# Patient Record
Sex: FEMALE | Race: WHITE | NOT HISPANIC OR LATINO | Employment: UNEMPLOYED | URBAN - METROPOLITAN AREA
[De-identification: names, ages, dates, MRNs, and addresses within clinical notes are randomized per-mention and may not be internally consistent; named-entity substitution may affect disease eponyms.]

---

## 2017-01-17 ENCOUNTER — APPOINTMENT (OUTPATIENT)
Dept: WOMENS IMAGING | Facility: HOSPITAL | Age: 57
End: 2017-01-17

## 2017-01-17 PROCEDURE — 77065 DX MAMMO INCL CAD UNI: CPT | Performed by: RADIOLOGY

## 2017-01-17 PROCEDURE — G0206 DX MAMMO INCL CAD UNI: HCPCS | Performed by: RADIOLOGY

## 2017-01-17 PROCEDURE — 76641 ULTRASOUND BREAST COMPLETE: CPT | Performed by: RADIOLOGY

## 2017-01-17 PROCEDURE — 77061 BREAST TOMOSYNTHESIS UNI: CPT | Performed by: RADIOLOGY

## 2017-01-17 PROCEDURE — G0279 TOMOSYNTHESIS, MAMMO: HCPCS | Performed by: RADIOLOGY

## 2017-05-01 ENCOUNTER — TRANSCRIBE ORDERS (OUTPATIENT)
Dept: ADMINISTRATIVE | Facility: HOSPITAL | Age: 57
End: 2017-05-01

## 2017-05-01 DIAGNOSIS — Z80.3 FH: BREAST CANCER: Primary | ICD-10-CM

## 2017-06-06 ENCOUNTER — HOSPITAL ENCOUNTER (OUTPATIENT)
Dept: MRI IMAGING | Facility: HOSPITAL | Age: 57
Discharge: HOME | End: 2017-06-06
Attending: OBSTETRICS & GYNECOLOGY | Admitting: OBSTETRICS & GYNECOLOGY

## 2017-06-06 DIAGNOSIS — Z80.3 FH: BREAST CANCER: ICD-10-CM

## 2017-06-06 LAB — CREAT BLDA-MCNC: 0.8 MG/DL (ref 0.6–1.3)

## 2017-06-06 PROCEDURE — 0159T HC MRI BREAST COMPUTER ANALYSIS: CPT

## 2017-06-06 PROCEDURE — A9577 INJ MULTIHANCE: HCPCS | Performed by: OBSTETRICS & GYNECOLOGY

## 2017-06-06 PROCEDURE — C8908 MRI W/O FOL W/CONT, BREAST,: HCPCS

## 2017-06-06 PROCEDURE — 82565 ASSAY OF CREATININE: CPT

## 2017-06-06 PROCEDURE — 0 GADOBENATE DIMEGLUMINE 529 MG/ML SOLUTION: Performed by: OBSTETRICS & GYNECOLOGY

## 2017-06-06 RX ADMIN — GADOBENATE DIMEGLUMINE 15 ML: 529 INJECTION, SOLUTION INTRAVENOUS at 10:20

## 2017-11-20 RX ORDER — ESCITALOPRAM OXALATE 10 MG/1
TABLET ORAL
Qty: 30 TABLET | Refills: 0 | Status: SHIPPED | OUTPATIENT
Start: 2017-11-20 | End: 2017-12-21 | Stop reason: SDUPTHER

## 2017-12-07 ENCOUNTER — APPOINTMENT (OUTPATIENT)
Dept: WOMENS IMAGING | Facility: HOSPITAL | Age: 57
End: 2017-12-07

## 2017-12-07 PROCEDURE — 77063 BREAST TOMOSYNTHESIS BI: CPT | Performed by: RADIOLOGY

## 2017-12-07 PROCEDURE — 77067 SCR MAMMO BI INCL CAD: CPT | Performed by: RADIOLOGY

## 2017-12-21 RX ORDER — ESCITALOPRAM OXALATE 10 MG/1
TABLET ORAL
Qty: 15 TABLET | Refills: 0 | Status: SHIPPED | OUTPATIENT
Start: 2017-12-21 | End: 2018-01-11 | Stop reason: SDUPTHER

## 2018-01-09 RX ORDER — ESCITALOPRAM OXALATE 10 MG/1
TABLET ORAL
Qty: 15 TABLET | Refills: 0 | OUTPATIENT
Start: 2018-01-09

## 2018-01-11 ENCOUNTER — OFFICE VISIT (OUTPATIENT)
Dept: FAMILY MEDICINE CLINIC | Facility: CLINIC | Age: 58
End: 2018-01-11

## 2018-01-11 VITALS
BODY MASS INDEX: 27.47 KG/M2 | HEART RATE: 82 BPM | WEIGHT: 175 LBS | RESPIRATION RATE: 18 BRPM | HEIGHT: 67 IN | SYSTOLIC BLOOD PRESSURE: 120 MMHG | DIASTOLIC BLOOD PRESSURE: 62 MMHG

## 2018-01-11 DIAGNOSIS — F41.9 ANXIETY: Primary | ICD-10-CM

## 2018-01-11 PROCEDURE — 99213 OFFICE O/P EST LOW 20 MIN: CPT | Performed by: INTERNAL MEDICINE

## 2018-01-11 RX ORDER — RALOXIFENE HYDROCHLORIDE 60 MG/1
60 TABLET, FILM COATED ORAL DAILY
Refills: 12 | COMMUNITY
Start: 2018-01-03 | End: 2022-02-11

## 2018-01-11 RX ORDER — BUSPIRONE HYDROCHLORIDE 30 MG/1
30 TABLET ORAL DAILY
Qty: 30 TABLET | Refills: 11 | Status: SHIPPED | OUTPATIENT
Start: 2018-01-11 | End: 2019-01-25 | Stop reason: SDUPTHER

## 2018-01-11 RX ORDER — ESCITALOPRAM OXALATE 10 MG/1
10 TABLET ORAL DAILY
Qty: 30 TABLET | Refills: 11 | Status: SHIPPED | OUTPATIENT
Start: 2018-01-11 | End: 2019-02-19 | Stop reason: SDUPTHER

## 2018-01-11 NOTE — PROGRESS NOTES
Wilson Wood is a 57 y.o. female. Patient is here today for   Chief Complaint   Patient presents with   • Anxiety     Buspar           Vitals:    01/11/18 0842   BP: 120/62   Pulse: 82   Resp: 18     The following portions of the patient's history were reviewed and updated as appropriate: allergies, current medications, past family history, past medical history, past social history, past surgical history and problem list.    Past Medical History:   Diagnosis Date   • Allergic rhinitis    • Anxiety    • Hypothyroidism       Allergies   Allergen Reactions   • Sulfa Antibiotics Hives     Hives, itching, swelling   • Latex Hives     Hives and itching      Social History     Social History   • Marital status:      Spouse name: N/A   • Number of children: N/A   • Years of education: N/A     Occupational History   • Not on file.     Social History Main Topics   • Smoking status: Never Smoker   • Smokeless tobacco: Not on file   • Alcohol use Yes   • Drug use: Not on file   • Sexual activity: Not on file     Other Topics Concern   • Not on file     Social History Narrative        Current Outpatient Prescriptions:   •  albuterol (PROAIR HFA) 108 (90 BASE) MCG/ACT inhaler, ProAir  (90 Base) MCG/ACT Inhalation Aerosol Solution; Patient Sig: ProAir  (90 Base) MCG/ACT Inhalation Aerosol Solution INHALE 1 PUFF EVERY 4 HOURS AS NEEDED; 1; 3; 02-Jan-2015; Active, Disp: , Rfl:   •  busPIRone (BUSPAR) 30 MG tablet, Take 1 tablet by mouth Daily., Disp: 30 tablet, Rfl: 11  •  Cholecalciferol (VITAMIN D) 2000 UNITS capsule, Take  by mouth., Disp: , Rfl:   •  escitalopram (LEXAPRO) 10 MG tablet, Take 1 tablet by mouth Daily., Disp: 30 tablet, Rfl: 11  •  fexofenadine (ALLEGRA ALLERGY) 180 MG tablet, Take  by mouth., Disp: , Rfl:   •  multivitamin (THERAGRAN) tablet tablet, Take  by mouth., Disp: , Rfl:   •  Omega-3 Fatty Acids (FISH OIL) 1000 MG capsule capsule, Take  by mouth., Disp: , Rfl:   •   SYNTHROID 50 MCG tablet, TAKE 3 TABLETS BY MOUTH AT BEDTIME, Disp: , Rfl: 12  •  vitamin B-12 (CYANOCOBALAMIN) 100 MCG tablet, Take  by mouth., Disp: , Rfl:   •  raloxifene (EVISTA) 60 MG tablet, TAKE 1 TABLET BY MOUTH DAILY, Disp: , Rfl: 12     Objective     History of Present Illness Jessica is here today to get a refill on Lexapro and BuSpar that she takes for chronic anxiety.  She was going to a psychiatrist but her office closed.  She feels well.  She exercises at least 3 days a week.  She also has hypothyroidism and is followed by endocrinology.  She sees her gynecologist annually.  She did get a flu vaccination this year.    Review of Systems   Constitutional: Negative for activity change.   Respiratory: Negative.    Cardiovascular: Negative.    Psychiatric/Behavioral: Negative for dysphoric mood. The patient is not nervous/anxious.        Physical Exam   Constitutional: She appears well-developed and well-nourished.   Cardiovascular: Normal rate, regular rhythm and normal heart sounds.    Neurological: She is alert.   Psychiatric: She has a normal mood and affect. Her behavior is normal. Judgment and thought content normal.   Vitals reviewed.      ASSESSMENT     Problem List Items Addressed This Visit        Other    Anxiety - Primary          PLAN  Patient Instructions   We'll continue Lexapro and BuSpar at current dose.  Continue exercise as well.  Follow-up annually    Return in about 1 year (around 1/11/2019).

## 2018-01-11 NOTE — PATIENT INSTRUCTIONS
We'll continue Lexapro and BuSpar at current dose.  Continue exercise as well.  Follow-up annually

## 2018-06-01 ENCOUNTER — TRANSCRIBE ORDERS (OUTPATIENT)
Dept: ADMINISTRATIVE | Facility: HOSPITAL | Age: 58
End: 2018-06-01

## 2018-06-01 DIAGNOSIS — Z80.3 FAMILY HISTORY OF MALIGNANT NEOPLASM OF BREAST: Primary | ICD-10-CM

## 2018-06-18 ENCOUNTER — HOSPITAL ENCOUNTER (OUTPATIENT)
Dept: MRI IMAGING | Facility: HOSPITAL | Age: 58
Discharge: HOME | End: 2018-06-18
Attending: OBSTETRICS & GYNECOLOGY | Admitting: OBSTETRICS & GYNECOLOGY

## 2018-06-18 DIAGNOSIS — Z80.3 FAMILY HISTORY OF MALIGNANT NEOPLASM OF BREAST: ICD-10-CM

## 2018-06-18 PROCEDURE — 82565 ASSAY OF CREATININE: CPT

## 2018-06-18 PROCEDURE — A9577 INJ MULTIHANCE: HCPCS | Performed by: OBSTETRICS & GYNECOLOGY

## 2018-06-18 PROCEDURE — 0 GADOBENATE DIMEGLUMINE 529 MG/ML SOLUTION: Performed by: OBSTETRICS & GYNECOLOGY

## 2018-06-18 PROCEDURE — 0159T HC MRI BREAST COMPUTER ANALYSIS: CPT

## 2018-06-18 PROCEDURE — C8908 MRI W/O FOL W/CONT, BREAST,: HCPCS

## 2018-06-18 RX ADMIN — GADOBENATE DIMEGLUMINE 16 ML: 529 INJECTION, SOLUTION INTRAVENOUS at 16:18

## 2018-06-19 LAB — CREAT BLDA-MCNC: 0.8 MG/DL (ref 0.6–1.3)

## 2018-12-13 ENCOUNTER — APPOINTMENT (OUTPATIENT)
Dept: WOMENS IMAGING | Facility: HOSPITAL | Age: 58
End: 2018-12-13

## 2018-12-13 PROCEDURE — 77063 BREAST TOMOSYNTHESIS BI: CPT | Performed by: RADIOLOGY

## 2018-12-13 PROCEDURE — 77067 SCR MAMMO BI INCL CAD: CPT | Performed by: RADIOLOGY

## 2019-01-14 RX ORDER — ESCITALOPRAM OXALATE 10 MG/1
10 TABLET ORAL DAILY
Qty: 30 TABLET | Refills: 11 | OUTPATIENT
Start: 2019-01-14

## 2019-01-25 RX ORDER — BUSPIRONE HYDROCHLORIDE 30 MG/1
30 TABLET ORAL DAILY
Qty: 30 TABLET | Refills: 0 | Status: SHIPPED | OUTPATIENT
Start: 2019-01-25 | End: 2019-02-21 | Stop reason: SDUPTHER

## 2019-02-19 ENCOUNTER — TELEPHONE (OUTPATIENT)
Dept: FAMILY MEDICINE CLINIC | Facility: CLINIC | Age: 59
End: 2019-02-19

## 2019-02-19 RX ORDER — ESCITALOPRAM OXALATE 10 MG/1
10 TABLET ORAL DAILY
Qty: 30 TABLET | Refills: 11 | OUTPATIENT
Start: 2019-02-19

## 2019-02-19 RX ORDER — ESCITALOPRAM OXALATE 10 MG/1
10 TABLET ORAL DAILY
Qty: 30 TABLET | Refills: 0 | Status: SHIPPED | OUTPATIENT
Start: 2019-02-19 | End: 2019-03-18 | Stop reason: SDUPTHER

## 2019-02-19 NOTE — TELEPHONE ENCOUNTER
Rx sent to pharmacy      ----- Message from Cristine Brothers sent at 2/19/2019  9:22 AM EST -----  Contact: [phone]  HAD ASK FOR REFILL ON LEXAPRO  BUT WAS TOLD NEED TO CALL US- SHE IS LEAVING FOR [place] TOMORROW AND WILL NOT BE BACK UNTIL END OF MARCH, SHE DID MAKE AN APT FOR THEN BUT DOES NOT HAVE ENOUGH TO LAST HER UNTIL THEN. CAN SHE GET A 30 DAY TO HOLD HER UNTIL SHE COMES BACK      LEXAPRO GENERIC 10 MG 1 QD    [facility] [address]

## 2019-02-21 RX ORDER — BUSPIRONE HYDROCHLORIDE 30 MG/1
30 TABLET ORAL DAILY
Qty: 30 TABLET | Refills: 0 | Status: SHIPPED | OUTPATIENT
Start: 2019-02-21 | End: 2019-04-23 | Stop reason: SDUPTHER

## 2019-03-18 RX ORDER — ESCITALOPRAM OXALATE 10 MG/1
TABLET ORAL
Qty: 30 TABLET | Refills: 0 | Status: SHIPPED | OUTPATIENT
Start: 2019-03-18 | End: 2019-03-28 | Stop reason: SDUPTHER

## 2019-03-28 ENCOUNTER — OFFICE VISIT (OUTPATIENT)
Dept: FAMILY MEDICINE CLINIC | Facility: CLINIC | Age: 59
End: 2019-03-28

## 2019-03-28 VITALS
HEIGHT: 67 IN | BODY MASS INDEX: 27 KG/M2 | WEIGHT: 172 LBS | SYSTOLIC BLOOD PRESSURE: 120 MMHG | DIASTOLIC BLOOD PRESSURE: 80 MMHG | RESPIRATION RATE: 18 BRPM | HEART RATE: 66 BPM

## 2019-03-28 DIAGNOSIS — F41.9 ANXIETY: Primary | ICD-10-CM

## 2019-03-28 DIAGNOSIS — E03.9 ACQUIRED HYPOTHYROIDISM: ICD-10-CM
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
## 2019-03-28 PROCEDURE — 99214 OFFICE O/P EST MOD 30 MIN: CPT | Performed by: INTERNAL MEDICINE

## 2019-03-28 RX ORDER — LEVOTHYROXINE SODIUM 13 UG/1
137 CAPSULE ORAL DAILY
Refills: 5 | COMMUNITY
Start: 2019-01-30 | End: 2021-09-22 | Stop reason: DRUGHIGH

## 2019-03-28 RX ORDER — ESCITALOPRAM OXALATE 10 MG/1
10 TABLET ORAL DAILY
Qty: 90 TABLET | Refills: 3 | Status: SHIPPED | OUTPATIENT
Start: 2019-03-28 | End: 2020-01-30 | Stop reason: SDUPTHER

## 2019-03-28 NOTE — PATIENT INSTRUCTIONS
Discussed anxiety at length.  Continue exercise and current medicines.  Apply triamcinolone ointment to spider bite twice a day.

## 2019-03-28 NOTE — PROGRESS NOTES
Wilson Wood is a 58 y.o. female. Patient is here today for   Chief Complaint   Patient presents with   • Anxiety          Vitals:    03/28/19 0912   BP: 120/80   Pulse: 66   Resp: 18     The following portions of the patient's history were reviewed and updated as appropriate: allergies, current medications, past family history, past medical history, past social history, past surgical history and problem list.    Past Medical History:   Diagnosis Date   • Allergic rhinitis    • Anxiety    • Hypothyroidism       Allergies   Allergen Reactions   • Sulfa Antibiotics Hives     Hives, itching, swelling   • Latex Hives     Hives and itching      Social History     Socioeconomic History   • Marital status:      Spouse name: Not on file   • Number of children: Not on file   • Years of education: Not on file   • Highest education level: Not on file   Tobacco Use   • Smoking status: Never Smoker   Substance and Sexual Activity   • Alcohol use: Yes        Current Outpatient Medications:   •  albuterol (PROAIR HFA) 108 (90 BASE) MCG/ACT inhaler, ProAir  (90 Base) MCG/ACT Inhalation Aerosol Solution; Patient Sig: ProAir  (90 Base) MCG/ACT Inhalation Aerosol Solution INHALE 1 PUFF EVERY 4 HOURS AS NEEDED; 1; 3; 02-Jan-2015; Active, Disp: , Rfl:   •  busPIRone (BUSPAR) 30 MG tablet, TAKE 1 TABLET BY MOUTH DAILY. DUE FOR APPOINTMENT., Disp: 30 tablet, Rfl: 0  •  Cholecalciferol (VITAMIN D) 2000 UNITS capsule, Take  by mouth., Disp: , Rfl:   •  escitalopram (LEXAPRO) 10 MG tablet, Take 1 tablet by mouth Daily., Disp: 90 tablet, Rfl: 3  •  fexofenadine (ALLEGRA ALLERGY) 180 MG tablet, Take  by mouth., Disp: , Rfl:   •  multivitamin (THERAGRAN) tablet tablet, Take  by mouth., Disp: , Rfl:   •  Omega-3 Fatty Acids (FISH OIL) 1000 MG capsule capsule, Take  by mouth., Disp: , Rfl:   •  raloxifene (EVISTA) 60 MG tablet, TAKE 1 TABLET BY MOUTH DAILY, Disp: , Rfl: 12  •  vitamin B-12 (CYANOCOBALAMIN) 100 MCG  tablet, Take  by mouth., Disp: , Rfl:   •  TIROSINT 150 MCG capsule, TAKE ONE CAPSULE AT BEDTIME, Disp: , Rfl: 5  •  triamcinolone (KENALOG) 0.1 % ointment, Apply  topically to the appropriate area as directed 2 (Two) Times a Day., Disp: 15 g, Rfl: 1     Objective     History of Present Illness Garth is here to get a refill on S citalopram and buspirone that she takes for chronic anxiety.  She feels well.  She eats healthy and exercises almost on a daily basis.  She has hypothyroidism and is followed by endocrinology.  Today she complains of an insect bite on her posterior right neck that she just noticed last night.    Review of Systems   Constitutional: Negative for activity change and unexpected weight change.   Respiratory: Negative.    Cardiovascular: Negative.    Psychiatric/Behavioral: Negative for agitation and dysphoric mood.       Physical Exam   Constitutional: She appears well-developed and well-nourished.   Cardiovascular: Normal rate, regular rhythm and normal heart sounds.   Skin:   2 x 3 cm area of induration and erythema with central bite right posterior neck   Psychiatric: She has a normal mood and affect. Her behavior is normal. Judgment and thought content normal.   Vitals reviewed.      ASSESSMENT     Problem List Items Addressed This Visit        Endocrine    Acquired hypothyroidism    Relevant Medications    TIROSINT 150 MCG capsule       Other    Anxiety - Primary    Insect bite          PLAN  Patient Instructions   Discussed anxiety at length.  Continue exercise and current medicines.  Apply triamcinolone ointment to spider bite twice a day.    Return in about 1 year (around 3/28/2020) for Recheck.

## 2019-04-23 RX ORDER — BUSPIRONE HYDROCHLORIDE 30 MG/1
30 TABLET ORAL DAILY
Qty: 30 TABLET | Refills: 5 | Status: SHIPPED | OUTPATIENT
Start: 2019-04-23 | End: 2021-09-22

## 2019-06-07 ENCOUNTER — TRANSCRIBE ORDERS (OUTPATIENT)
Dept: ADMINISTRATIVE | Facility: HOSPITAL | Age: 59
End: 2019-06-07

## 2019-06-07 DIAGNOSIS — Z80.3 FAMILY HISTORY OF BREAST CANCER: Primary | ICD-10-CM

## 2019-06-18 ENCOUNTER — OFFICE VISIT (OUTPATIENT)
Dept: FAMILY MEDICINE CLINIC | Facility: CLINIC | Age: 59
End: 2019-06-18

## 2019-06-18 VITALS
OXYGEN SATURATION: 98 % | WEIGHT: 166 LBS | DIASTOLIC BLOOD PRESSURE: 70 MMHG | HEART RATE: 82 BPM | BODY MASS INDEX: 26.06 KG/M2 | SYSTOLIC BLOOD PRESSURE: 140 MMHG | HEIGHT: 67 IN | TEMPERATURE: 97.2 F | RESPIRATION RATE: 15 BRPM

## 2019-06-18 DIAGNOSIS — M70.52 BURSITIS OF LEFT KNEE, UNSPECIFIED BURSA: Primary | ICD-10-CM
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
## 2019-06-18 PROCEDURE — 99213 OFFICE O/P EST LOW 20 MIN: CPT | Performed by: NURSE PRACTITIONER

## 2019-06-18 RX ORDER — ALPRAZOLAM 0.25 MG/1
TABLET ORAL
Refills: 0 | COMMUNITY
Start: 2019-06-10 | End: 2021-09-22

## 2019-06-18 RX ORDER — METHYLPREDNISOLONE 4 MG/1
TABLET ORAL
Qty: 21 TABLET | Refills: 0 | Status: SHIPPED | OUTPATIENT
Start: 2019-06-18 | End: 2021-09-22

## 2019-06-18 NOTE — PATIENT INSTRUCTIONS
Bursitis  Bursitis is inflammation and irritation of a bursa, which is one of the small, fluid-filled sacs that cushion and protect the moving parts of your body. These sacs are located between bones and muscles, muscle attachments, or skin areas next to bones. A bursa protects these structures from the wear and tear that results from frequent movement.  An inflamed bursa causes pain and swelling. Fluid may build up inside the sac. Bursitis is most common near joints, especially the knees, elbows, hips, and shoulders.  What are the causes?  Bursitis can be caused by:  · Injury from:  ? A direct blow, like falling on your knee or elbow.  ? Overuse of a joint (repetitive stress).  · Infection. This can happen if bacteria gets into a bursa through a cut or scrape near a joint.  · Diseases that cause joint inflammation, such as gout and rheumatoid arthritis.    What increases the risk?  You may be at risk for bursitis if you:  · Have a job or hobby that involves a lot of repetitive stress on your joints.  · Have a condition that weakens your body's defense system (immune system), such as diabetes, cancer, or HIV.  · Lift and reach overhead often.  · Kneel or lean on hard surfaces often.  · Run or walk often.    What are the signs or symptoms?  The most common signs and symptoms of bursitis are:  · Pain that gets worse when you move the affected body part or put weight on it.  · Inflammation.  · Stiffness.    Other signs and symptoms may include:  · Redness.  · Tenderness.  · Warmth.  · Pain that continues after rest.  · Fever and chills. This may occur in bursitis caused by infection.    How is this diagnosed?  Bursitis may be diagnosed by:  · Medical history and physical exam.  · MRI.  · A procedure to drain fluid from the bursa with a needle (aspiration). The fluid may be checked for signs of infection or gout.  · Blood tests to rule out other causes of inflammation.    How is this treated?  Bursitis can usually be  treated at home with rest, ice, compression, and elevation (RICE). For mild bursitis, RICE treatment may be all you need. Other treatments may include:  · Nonsteroidal anti-inflammatory drugs (NSAIDs) to treat pain and inflammation.  · Corticosteroids to fight inflammation. You may have these drugs injected into and around the area of bursitis.  · Aspiration of bursitis fluid to relieve pain and improve movement.  · Antibiotic medicine to treat an infected bursa.  · A splint, brace, or walking aid.  · Physical therapy if you continue to have pain or limited movement.  · Surgery to remove a damaged or infected bursa. This may be needed if you have a very bad case of bursitis or if other treatments have not worked.    Follow these instructions at home:  · Take medicines only as directed by your health care provider.  · If you were prescribed an antibiotic medicine, finish it all even if you start to feel better.  · Rest the affected area as directed by your health care provider.  ? Keep the area elevated.  ? Avoid activities that make pain worse.  · Apply ice to the injured area:  ? Place ice in a plastic bag.  ? Place a towel between your skin and the bag.  ? Leave the ice on for 20 minutes, 2-3 times a day.  · Use splints, braces, pads, or walking aids as directed by your health care provider.  · Keep all follow-up visits as directed by your health care provider. This is important.  How is this prevented?  · Wear knee pads if you kneel often.  · Wear sturdy running or walking shoes that fit you well.  · Take regular breaks from repetitive activity.  · Warm up by stretching before doing any strenuous activity.  · Maintain a healthy weight or lose weight as recommended by your health care provider. Ask your health care provider if you need help.  · Exercise regularly. Start any new physical activity gradually.  Contact a health care provider if:  · Your bursitis is not responding to treatment or home care.  · You have  a fever.  · You have chills.  This information is not intended to replace advice given to you by your health care provider. Make sure you discuss any questions you have with your health care provider.  Document Released: 12/15/2001 Document Revised: 05/25/2017 Document Reviewed: 03/09/2015  Elsevier Interactive Patient Education © 2018 [employer]

## 2019-06-18 NOTE — PROGRESS NOTES
Subjective   Jessica Wood is a 58 y.o. female.   Chief Complaint   Patient presents with   • Knee Pain     off and on a 1 month left knee     Vitals:    06/18/19 1316   BP: 140/70   Pulse: 82   Resp: 15   Temp: 97.2 °F (36.2 °C)   SpO2: 98%     No LMP recorded.    Jessica is a patient of Dr Venegas who is here for an acute visit. This is a new problem.       Knee Pain    There was no injury mechanism. The pain is present in the left knee. The pain is at a severity of 4/10. The pain is mild. The pain has been intermittent since onset. Pertinent negatives include no loss of motion, loss of sensation, muscle weakness, numbness or tingling. The symptoms are aggravated by movement, palpation and weight bearing. She has tried rest, NSAIDs and non-weight bearing for the symptoms. The treatment provided mild relief.        The following portions of the patient's history were reviewed and updated as appropriate: allergies, current medications, past family history, past medical history, past social history, past surgical history and problem list.    Review of Systems   Constitutional: Negative for chills, diaphoresis, fatigue and fever.   Respiratory: Negative.    Cardiovascular: Negative.    Musculoskeletal: Positive for joint swelling (knee ). Negative for gait problem.        Left knee pain as described in HPI    Neurological: Negative for tingling and numbness.       Objective   Physical Exam   Constitutional: Vital signs are normal. She appears well-developed and well-nourished. No distress.   HENT:   Head: Normocephalic.   Cardiovascular: Normal rate.   Pulmonary/Chest: Effort normal.   Musculoskeletal:        Left knee: She exhibits swelling. She exhibits normal range of motion and no erythema (warmth ). Tenderness found.   Neurological: She is alert.   Skin: Skin is warm and dry.       Assessment/Plan   Jessica was seen today for knee pain.    Diagnoses and all orders for this visit:    Bursitis of left knee, unspecified  bursa    Other orders  -     methylPREDNISolone (MEDROL, BLAKE,) 4 MG tablet; Take as directed on package instructions.      Rest ice and elevate  Can use knee brace of needed for support  Recommend low impact exercises , discussed swimming, walking, and stretches  If no improvement, advised to call and will order an xray  Follow up if symptoms persist, worsen or if new symptoms develop

## 2019-06-24 ENCOUNTER — HOSPITAL ENCOUNTER (OUTPATIENT)
Dept: MRI IMAGING | Facility: HOSPITAL | Age: 59
Discharge: HOME | End: 2019-06-24
Admitting: OBSTETRICS & GYNECOLOGY

## 2019-06-24 DIAGNOSIS — Z80.3 FAMILY HISTORY OF BREAST CANCER: ICD-10-CM

## 2019-06-24 PROCEDURE — 77049 MRI BREAST C-+ W/CAD BI: CPT

## 2019-06-24 PROCEDURE — 0 GADOBENATE DIMEGLUMINE 529 MG/ML SOLUTION: Performed by: OBSTETRICS & GYNECOLOGY

## 2019-06-24 PROCEDURE — 82565 ASSAY OF CREATININE: CPT

## 2019-06-24 PROCEDURE — A9577 INJ MULTIHANCE: HCPCS | Performed by: OBSTETRICS & GYNECOLOGY

## 2019-06-24 RX ADMIN — GADOBENATE DIMEGLUMINE 15 ML: 529 INJECTION, SOLUTION INTRAVENOUS at 18:16

## 2019-06-25 LAB — CREAT BLDA-MCNC: 0.7 MG/DL (ref 0.6–1.3)

## 2019-07-08 ENCOUNTER — TELEPHONE (OUTPATIENT)
Dept: FAMILY MEDICINE CLINIC | Facility: CLINIC | Age: 59
End: 2019-07-08

## 2019-07-08 NOTE — TELEPHONE ENCOUNTER
-----patient was informed to schedule an appointment     Message from Jeannie Kinsey sent at 7/8/2019 12:21 PM EDT -----  WANTING TO SEE A RX FOR A YEAST  INFECTION  SAYS SHE JST GOT BACK FROM VACATION AND HAD A SWIM SUIT ON ALL WEEK AND THINKS THAT WHAT HAS IRRITATED HER       [facility] [place]    PLEASE CALL PT TO LET HER KNOW IF THIS WILL BE CALLED IN   SAYS DR SOLIS KNOWS HER MOM HAS LUKEMIA AND HAS A HARD TIME COMING IN BC OF HER MOMS APPT ETC

## 2020-01-09 ENCOUNTER — APPOINTMENT (OUTPATIENT)
Dept: WOMENS IMAGING | Facility: HOSPITAL | Age: 60
End: 2020-01-09

## 2020-01-09 PROCEDURE — 77067 SCR MAMMO BI INCL CAD: CPT | Performed by: RADIOLOGY

## 2020-01-09 PROCEDURE — 77063 BREAST TOMOSYNTHESIS BI: CPT | Performed by: RADIOLOGY

## 2020-01-30 RX ORDER — ESCITALOPRAM OXALATE 10 MG/1
10 TABLET ORAL DAILY
Qty: 30 TABLET | Refills: 0 | Status: SHIPPED | OUTPATIENT
Start: 2020-01-30 | End: 2020-02-25 | Stop reason: SDUPTHER

## 2020-02-25 RX ORDER — ESCITALOPRAM OXALATE 10 MG/1
10 TABLET ORAL DAILY
Qty: 30 TABLET | Refills: 0 | Status: SHIPPED | OUTPATIENT
Start: 2020-02-25 | End: 2020-04-14

## 2020-04-14 RX ORDER — ESCITALOPRAM OXALATE 10 MG/1
TABLET ORAL
Qty: 30 TABLET | Refills: 1 | Status: SHIPPED | OUTPATIENT
Start: 2020-04-14 | End: 2020-06-18

## 2020-06-18 RX ORDER — ESCITALOPRAM OXALATE 10 MG/1
TABLET ORAL
Qty: 30 TABLET | Refills: 1 | Status: ON HOLD | OUTPATIENT
Start: 2020-06-18 | End: 2022-02-14

## 2020-07-16 ENCOUNTER — TRANSCRIBE ORDERS (OUTPATIENT)
Dept: ADMINISTRATIVE | Facility: HOSPITAL | Age: 60
End: 2020-07-16

## 2020-07-16 DIAGNOSIS — Z80.3 FAMILY HISTORY OF MALIGNANT NEOPLASM OF BREAST: Primary | ICD-10-CM

## 2020-07-30 ENCOUNTER — HOSPITAL ENCOUNTER (OUTPATIENT)
Dept: MRI IMAGING | Facility: HOSPITAL | Age: 60
Discharge: HOME | End: 2020-07-30
Admitting: OBSTETRICS & GYNECOLOGY

## 2020-07-30 DIAGNOSIS — Z80.3 FAMILY HISTORY OF MALIGNANT NEOPLASM OF BREAST: ICD-10-CM

## 2020-07-30 PROCEDURE — 82565 ASSAY OF CREATININE: CPT

## 2020-07-30 PROCEDURE — 77049 MRI BREAST C-+ W/CAD BI: CPT

## 2020-07-30 PROCEDURE — A9575 INJ GADOTERATE MEGLUMI 0.1ML: HCPCS | Performed by: OBSTETRICS & GYNECOLOGY

## 2020-07-30 PROCEDURE — 25010000002 GADOTERATE MEGLUMINE 7.5 MMOL/15ML SOLUTION: Performed by: OBSTETRICS & GYNECOLOGY

## 2020-07-30 RX ORDER — GADOTERATE MEGLUMINE 376.9 MG/ML
15 INJECTION INTRAVENOUS
Status: COMPLETED | OUTPATIENT
Start: 2020-07-30 | End: 2020-07-30

## 2020-07-30 RX ADMIN — GADOTERATE MEGLUMINE 15 ML: 376.9 INJECTION, SOLUTION INTRAVENOUS at 10:27

## 2020-07-31 LAB — CREAT BLDA-MCNC: 0.8 MG/DL (ref 0.6–1.3)

## 2020-08-20 ENCOUNTER — TELEPHONE (OUTPATIENT)
Dept: FAMILY MEDICINE CLINIC | Facility: CLINIC | Age: 60
End: 2020-08-20

## 2020-08-20 RX ORDER — ESCITALOPRAM OXALATE 10 MG/1
TABLET ORAL
Qty: 30 TABLET | Refills: 1 | OUTPATIENT
Start: 2020-08-20

## 2021-02-12 ENCOUNTER — APPOINTMENT (OUTPATIENT)
Dept: WOMENS IMAGING | Facility: HOSPITAL | Age: 61
End: 2021-02-12

## 2021-02-12 PROCEDURE — 77063 BREAST TOMOSYNTHESIS BI: CPT | Performed by: RADIOLOGY

## 2021-02-12 PROCEDURE — 77067 SCR MAMMO BI INCL CAD: CPT | Performed by: RADIOLOGY

## 2021-03-30 ENCOUNTER — BULK ORDERING (OUTPATIENT)
Dept: CASE MANAGEMENT | Facility: OTHER | Age: 61
End: 2021-03-30

## 2021-03-30 DIAGNOSIS — Z23 IMMUNIZATION DUE: ICD-10-CM

## 2021-07-13 ENCOUNTER — TRANSCRIBE ORDERS (OUTPATIENT)
Dept: ADMINISTRATIVE | Facility: HOSPITAL | Age: 61
End: 2021-07-13

## 2021-07-13 DIAGNOSIS — Z80.3 FAMILY HISTORY OF BREAST CANCER: Primary | ICD-10-CM

## 2021-08-24 ENCOUNTER — HOSPITAL ENCOUNTER (OUTPATIENT)
Dept: MRI IMAGING | Facility: HOSPITAL | Age: 61
Discharge: HOME | End: 2021-08-24
Admitting: OBSTETRICS & GYNECOLOGY

## 2021-08-24 DIAGNOSIS — Z80.3 FAMILY HISTORY OF BREAST CANCER: ICD-10-CM

## 2021-08-24 PROCEDURE — 0 GADOBENATE DIMEGLUMINE 529 MG/ML SOLUTION: Performed by: OBSTETRICS & GYNECOLOGY

## 2021-08-24 PROCEDURE — A9577 INJ MULTIHANCE: HCPCS | Performed by: OBSTETRICS & GYNECOLOGY

## 2021-08-24 PROCEDURE — 77049 MRI BREAST C-+ W/CAD BI: CPT

## 2021-08-24 RX ADMIN — GADOBENATE DIMEGLUMINE 15 ML: 529 INJECTION, SOLUTION INTRAVENOUS at 10:02

## 2021-09-15 ENCOUNTER — HOSPITAL ENCOUNTER (OUTPATIENT)
Dept: MAMMOGRAPHY | Facility: HOSPITAL | Age: 61
Discharge: HOME | End: 2021-09-15

## 2021-09-15 ENCOUNTER — HOSPITAL ENCOUNTER (OUTPATIENT)
Dept: MRI IMAGING | Facility: HOSPITAL | Age: 61
Discharge: HOME | End: 2021-09-15

## 2021-09-15 VITALS
BODY MASS INDEX: 25.01 KG/M2 | SYSTOLIC BLOOD PRESSURE: 121 MMHG | DIASTOLIC BLOOD PRESSURE: 69 MMHG | RESPIRATION RATE: 16 BRPM | HEART RATE: 76 BPM | HEIGHT: 68 IN | WEIGHT: 165 LBS | OXYGEN SATURATION: 98 % | TEMPERATURE: 97.1 F

## 2021-09-15 DIAGNOSIS — R92.8 ABNORMAL MRI, BREAST: ICD-10-CM

## 2021-09-15 LAB — CREAT BLDA-MCNC: 0.7 MG/DL (ref 0.6–1.3)

## 2021-09-15 PROCEDURE — 82565 ASSAY OF CREATININE: CPT

## 2021-09-15 PROCEDURE — 88305 TISSUE EXAM BY PATHOLOGIST: CPT | Performed by: OBSTETRICS & GYNECOLOGY

## 2021-09-15 PROCEDURE — 25010000003 LIDOCAINE 1 % SOLUTION: Performed by: OBSTETRICS & GYNECOLOGY

## 2021-09-15 PROCEDURE — 88342 IMHCHEM/IMCYTCHM 1ST ANTB: CPT | Performed by: OBSTETRICS & GYNECOLOGY

## 2021-09-15 PROCEDURE — A4648 IMPLANTABLE TISSUE MARKER: HCPCS

## 2021-09-15 PROCEDURE — 88360 TUMOR IMMUNOHISTOCHEM/MANUAL: CPT | Performed by: OBSTETRICS & GYNECOLOGY

## 2021-09-15 PROCEDURE — 77065 DX MAMMO INCL CAD UNI: CPT

## 2021-09-15 PROCEDURE — 0 GADOBENATE DIMEGLUMINE 529 MG/ML SOLUTION: Performed by: OBSTETRICS & GYNECOLOGY

## 2021-09-15 PROCEDURE — A9577 INJ MULTIHANCE: HCPCS | Performed by: OBSTETRICS & GYNECOLOGY

## 2021-09-15 PROCEDURE — 88341 IMHCHEM/IMCYTCHM EA ADD ANTB: CPT | Performed by: OBSTETRICS & GYNECOLOGY

## 2021-09-15 RX ORDER — LIDOCAINE HYDROCHLORIDE 10 MG/ML
1 INJECTION, SOLUTION INFILTRATION; PERINEURAL ONCE
Status: COMPLETED | OUTPATIENT
Start: 2021-09-15 | End: 2021-09-15

## 2021-09-15 RX ORDER — DIAZEPAM 5 MG/1
5 TABLET ORAL ONCE AS NEEDED
Status: CANCELLED | OUTPATIENT
Start: 2021-09-15

## 2021-09-15 RX ORDER — DIAZEPAM 5 MG/1
5 TABLET ORAL ONCE AS NEEDED
Status: COMPLETED | OUTPATIENT
Start: 2021-09-15 | End: 2021-09-15

## 2021-09-15 RX ORDER — LIDOCAINE HYDROCHLORIDE AND EPINEPHRINE 10; 10 MG/ML; UG/ML
10 INJECTION, SOLUTION INFILTRATION; PERINEURAL ONCE
Status: COMPLETED | OUTPATIENT
Start: 2021-09-15 | End: 2021-09-15

## 2021-09-15 RX ADMIN — GADOBENATE DIMEGLUMINE 15 ML: 529 INJECTION, SOLUTION INTRAVENOUS at 08:30

## 2021-09-15 RX ADMIN — LIDOCAINE HYDROCHLORIDE 1 ML: 10 INJECTION, SOLUTION INFILTRATION; PERINEURAL at 08:42

## 2021-09-15 RX ADMIN — DIAZEPAM 5 MG: 5 TABLET ORAL at 07:44

## 2021-09-15 RX ADMIN — LIDOCAINE HYDROCHLORIDE,EPINEPHRINE BITARTRATE 10 ML: 10; .01 INJECTION, SOLUTION INFILTRATION; PERINEURAL at 08:42

## 2021-09-16 LAB
LAB AP CASE REPORT: NORMAL
LAB AP DIAGNOSIS COMMENT: NORMAL
LAB AP SPECIAL STAINS: NORMAL
LAB AP SYNOPTIC CHECKLIST: NORMAL
PATH REPORT.FINAL DX SPEC: NORMAL
PATH REPORT.GROSS SPEC: NORMAL

## 2021-09-21 NOTE — PROGRESS NOTES
SURGERY  Jessica Wood   22    Chief Complaint:  Newly diagnosed breast cancer    HPI    Ms. Wood is a nice 60 y.o. female who presents at the referral of Dr Venegas with a breast cancer, detected on screening MRI.  Mammogram offset approx 6 months prior was negative.  She gets this yearly due to a strong family history of breast cancer.  Done 21, it showed a 1.5 cm irregular area of nonmass enhancement, new from prios MRI and suspicious, in the retro areolar left breast, 5.2 cm from the nipple.  The axillas appeared normal.    Biopsy 9/15/21 with clip showed DCIS, high nuclear grade with solid cribriform and central comedo type necrosis, ER low +, RI weak 2% +.  Diagnostic mammogram post biopsy showed the clip at about 2:30 location.     Her family history is notable for a mother (Martha Scott), and sister (Jordyn Beach) with breast cancer, both whom i cared for, her sister in her 30's and her mother with lymph node positive disease.  Her sister had bilateral metachronous cancers and her mother  from myelodysplasia, felt due to neoadjuvant treatment and thus Jessica is significantly concerned about inadequately treating this and then requiring additional intervention that may put her at risk at some sort of blood disorder.  She is also concerned about getting a second cancer on the opposite side just like her sister dead, all of this understandable.  In addition this is developed while she has been on Evista.  She is also concerned because it did not show up on the mammogram.     She comes in and within the first 3 minutes of our visit tells me that she just wants both breasts off.  Unfortunately her father just passed away about 2 weeks ago, her mother 2 years ago and so she has been under a great deal of stress.  She would definitely want reconstruction.    Her sister did get genetic testing she believes and was negative but she does not believe that her mother did.  I tried to look up her  records but she has been passed away long enough that I cannot access them.    She does suffer from anxiety and is on xanax, buspar, and lexapro.     Past Medical History:   Diagnosis Date   • Allergic rhinitis    • Anxiety    • Breast cancer (CMS/HCC)    • Hypothyroidism    • Thyroid nodule 2012    Thyroid removed     Past Surgical History:   Procedure Laterality Date   • BREAST BIOPSY Left    •  SECTION N/A    • TOTAL THYROIDECTOMY       Family History   Problem Relation Age of Onset   • Breast cancer Mother    • Breast cancer Sister    • Miscarriages / Stillbirths Sister      Social History     Socioeconomic History   • Marital status:      Spouse name: Not on file   • Number of children: Not on file   • Years of education: Not on file   • Highest education level: Not on file   Tobacco Use   • Smoking status: Never Smoker   • Smokeless tobacco: Never Used   Vaping Use   • Vaping Use: Never used   Substance and Sexual Activity   • Alcohol use: Yes     Alcohol/week: 2.0 standard drinks     Types: 2 Glasses of wine per week   • Drug use: Never   • Sexual activity: Yes     Partners: Male     Birth control/protection: Post-menopausal         Current Outpatient Medications:   •  albuterol (PROAIR HFA) 108 (90 BASE) MCG/ACT inhaler, ProAir  (90 Base) MCG/ACT Inhalation Aerosol Solution; Patient Sig: ProAir  (90 Base) MCG/ACT Inhalation Aerosol Solution INHALE 1 PUFF EVERY 4 HOURS AS NEEDED; 1; 3; -2015; Active, Disp: , Rfl:   •  Cholecalciferol (VITAMIN D) 2000 UNITS capsule, Take 2,000 Units by mouth Daily., Disp: , Rfl:   •  escitalopram (LEXAPRO) 10 MG tablet, TAKE 1 TABLET BY MOUTH EVERY DAY (Patient taking differently: Take 20 mg by mouth Daily.), Disp: 30 tablet, Rfl: 1  •  fexofenadine (ALLEGRA ALLERGY) 180 MG tablet, Take 180 mg by mouth Daily., Disp: , Rfl:   •  multivitamin (THERAGRAN) tablet tablet, Take 1 tablet by mouth Daily., Disp: , Rfl:   •   "Omega-3 Fatty Acids (FISH OIL) 1000 MG capsule capsule, Take  by mouth., Disp: , Rfl:   •  raloxifene (EVISTA) 60 MG tablet, TAKE 1 TABLET BY MOUTH DAILY, Disp: , Rfl: 12  •  Tirosint 137 MCG capsule, Take 137 mcg by mouth Daily., Disp: , Rfl:   •  vitamin B-12 (CYANOCOBALAMIN) 100 MCG tablet, Take 100 mcg by mouth Daily., Disp: , Rfl:     Allergies   Allergen Reactions   • Sulfa Antibiotics Hives     Hives, itching, swelling   • Latex Hives     Hives and itching       Review of Systems  Positive for dermatographia, anxiety.  All other systems reviewed and negative  Vitals:    09/22/21 1057   Weight: 73 kg (161 lb)   Height: 172.7 cm (67.99\")       PHYSICAL EXAM:    Ht 172.7 cm (67.99\")   Wt 73 kg (161 lb)   BMI 24.49 kg/m²   Body mass index is 24.49 kg/m².    Constitutional: well developed, well nourished, very attractive, appears younger than stated age  Eyes: sclera nonicteric, conjunctiva not injected   ENMT: Hearing intact, trachea midline, dentitia not seen with mask in place  CVS: RRR, no murmur, peripheral edema not present  Respiratory: CTA, normal respiratory effort   Gastrointestinal: no hepatosplenomegaly, abdomen soft, nontender  Genitourinary: inguinal hernia not present  Musculoskeletal: gait normal, muscle mass normal  Skin: warm and dry, no rashes visible.  Of good quality  Neurological: awake and alert, seems to have reasonable capacity for understanding for medical decision making  Psychiatric: appears to have reasonable judgement   Lymphatics: no cervical adenopathy or axillary adenopathy.  Breasts: With a bruise that is more dramatic on the medial breast than the lateral, the approach being lateral.  She indicates she is a C cup, but it appears more like a B cup and I think the breast was small enough that the point actually probably hit the dermis on the other side leading to a more dramatic bruise medially.  There is firmness here and it makes it difficult to really examine her but there is " no skin dimpling or nipple abnormalities on either side.    Radiographic Findings: I reviewed and there is a hematoma at the clip site as is fairly common    Lab: Pathology as above    Pamphlet reviewed: Breast cancer    IMPRESSION:  · Left breast DCIS, with comedonecrosis, high-grade, estimated 1.5 cm by MRI, 230, retroareolar, weakly ER/VA positive, clinically stage 0.  · Detected on screening MRI.  This is making her nervous about the ability to detect cancer in her.  · Sister with metachronous bilateral breast cancer, triple negative, diagnosed at age 37, requiring chemotherapy.  She initially underwent a lumpectomy on the first breast.  · Mother with lymph node positive breast cancer, age 55, death from possible complications of neoadjuvant therapy  · Anxiety    PLAN:  · Patient really would like to get bilateral mastectomies with reconstruction.  I have tried to talk her out of that in some ways telling her that the data does not support that a mastectomy will lengthen her lifetime at all and a lumpectomy with radiation would certainly be adequate for the left breast.  A mastectomy for the left breast only does not seem reasonable since she would still have the risk of the other breast.  Her anxiety is such that she just really believes she will have difficult time without having treatment of both breasts.  · Genetic testing.  Suggested that she had the [employer] testing today.  I did not suggest that I thought she would be positive honestly, since her sister was negative but still felt it would be appropriate to proceed  · Refer her to plastic surgery for evaluation of reconstruction.  I think she be a very good candidate and she is quite thin and does not smoke.  · Likely will opt for bilateral mastectomy (skin sparing hopefully) with left axillary sentinel node, with decision on frozen section made intraoperatively but likely for permanent only.  Immediate reconstruction.  · Schedule for about 4 weeks from  now so that we can be certain that Covid will be passed and we will be able to proceed with the surgery.  She certainly has time to think about her options    Dede Dillard MD  09/22/21

## 2021-09-22 ENCOUNTER — TRANSCRIBE ORDERS (OUTPATIENT)
Dept: SURGERY | Facility: CLINIC | Age: 61
End: 2021-09-22

## 2021-09-22 ENCOUNTER — OFFICE VISIT (OUTPATIENT)
Dept: SURGERY | Facility: CLINIC | Age: 61
End: 2021-09-22

## 2021-09-22 ENCOUNTER — TELEPHONE (OUTPATIENT)
Dept: SURGERY | Facility: CLINIC | Age: 61
End: 2021-09-22

## 2021-09-22 VITALS — WEIGHT: 161 LBS | HEIGHT: 68 IN | BODY MASS INDEX: 24.4 KG/M2

## 2021-09-22 DIAGNOSIS — D05.12 DUCTAL CARCINOMA IN SITU (DCIS) OF LEFT BREAST WITH COMEDONECROSIS: Primary | ICD-10-CM

## 2021-09-22 DIAGNOSIS — D05.92 CARCINOMA IN SITU OF LEFT BREAST, UNSPECIFIED TYPE: Primary | ICD-10-CM

## 2021-09-22 PROCEDURE — 99244 OFF/OP CNSLTJ NEW/EST MOD 40: CPT | Performed by: SURGERY

## 2021-09-22 RX ORDER — LEVOTHYROXINE SODIUM 137 UG/1
137 CAPSULE ORAL NIGHTLY
COMMUNITY
Start: 2021-08-21

## 2021-09-23 ENCOUNTER — TELEPHONE (OUTPATIENT)
Dept: SURGERY | Facility: CLINIC | Age: 61
End: 2021-09-23

## 2021-09-23 NOTE — TELEPHONE ENCOUNTER
Pt's sx tentatively on 11/10 she would like to know how long in hospital,recovery & how long someone will need to be with her

## 2021-09-24 DIAGNOSIS — D05.12 DUCTAL CARCINOMA IN SITU (DCIS) OF LEFT BREAST WITH COMEDONECROSIS: Primary | ICD-10-CM

## 2021-09-24 RX ORDER — CEFAZOLIN SODIUM 2 G/100ML
2 INJECTION, SOLUTION INTRAVENOUS ONCE
Status: CANCELLED | OUTPATIENT
Start: 2021-11-10 | End: 2021-09-24

## 2021-09-24 RX ORDER — PREGABALIN 75 MG/1
75 CAPSULE ORAL ONCE
Status: CANCELLED | OUTPATIENT
Start: 2021-11-10 | End: 2021-09-24

## 2021-09-24 RX ORDER — ACETAMINOPHEN 325 MG/1
1000 TABLET ORAL ONCE
Status: CANCELLED | OUTPATIENT
Start: 2021-11-10 | End: 2021-09-24

## 2021-09-24 RX ORDER — MELOXICAM 15 MG/1
15 TABLET ORAL ONCE
Status: CANCELLED | OUTPATIENT
Start: 2021-11-10 | End: 2021-09-24

## 2021-09-29 ENCOUNTER — TELEPHONE (OUTPATIENT)
Dept: SURGERY | Facility: CLINIC | Age: 61
End: 2021-09-29

## 2021-09-29 NOTE — TELEPHONE ENCOUNTER
Spoke w/ Juli @ Dr Mancilla's office to confirm pt's sx for 11/10 @ 800am & she could go ahead & add her part

## 2021-09-30 ENCOUNTER — TELEPHONE (OUTPATIENT)
Dept: SURGERY | Facility: CLINIC | Age: 61
End: 2021-09-30

## 2021-10-04 ENCOUNTER — PREP FOR SURGERY (OUTPATIENT)
Dept: OTHER | Facility: HOSPITAL | Age: 61
End: 2021-10-04

## 2021-10-04 DIAGNOSIS — D05.12 DUCTAL CARCINOMA IN SITU (DCIS) OF LEFT BREAST WITH COMEDONECROSIS: Primary | ICD-10-CM

## 2021-11-04 ENCOUNTER — PREP FOR SURGERY (OUTPATIENT)
Dept: OTHER | Facility: HOSPITAL | Age: 61
End: 2021-11-04

## 2021-11-04 DIAGNOSIS — D05.12 DUCTAL CARCINOMA IN SITU (DCIS) OF LEFT BREAST WITH COMEDONECROSIS: Primary | ICD-10-CM

## 2021-11-08 ENCOUNTER — PRE-ADMISSION TESTING (OUTPATIENT)
Dept: PREADMISSION TESTING | Facility: HOSPITAL | Age: 61
End: 2021-11-08

## 2021-11-08 VITALS
SYSTOLIC BLOOD PRESSURE: 147 MMHG | DIASTOLIC BLOOD PRESSURE: 70 MMHG | OXYGEN SATURATION: 99 % | WEIGHT: 168 LBS | BODY MASS INDEX: 25.46 KG/M2 | HEART RATE: 92 BPM | RESPIRATION RATE: 16 BRPM | HEIGHT: 68 IN | TEMPERATURE: 97.4 F

## 2021-11-08 DIAGNOSIS — D05.12 DUCTAL CARCINOMA IN SITU (DCIS) OF LEFT BREAST WITH COMEDONECROSIS: ICD-10-CM

## 2021-11-08 LAB
ANION GAP SERPL CALCULATED.3IONS-SCNC: 9.8 MMOL/L (ref 5–15)
BUN SERPL-MCNC: 12 MG/DL (ref 8–23)
BUN/CREAT SERPL: 17.1 (ref 7–25)
CALCIUM SPEC-SCNC: 9.8 MG/DL (ref 8.6–10.5)
CHLORIDE SERPL-SCNC: 104 MMOL/L (ref 98–107)
CO2 SERPL-SCNC: 28.2 MMOL/L (ref 22–29)
CREAT SERPL-MCNC: 0.7 MG/DL (ref 0.57–1)
DEPRECATED RDW RBC AUTO: 40.7 FL (ref 37–54)
ERYTHROCYTE [DISTWIDTH] IN BLOOD BY AUTOMATED COUNT: 11.6 % (ref 12.3–15.4)
GFR SERPL CREATININE-BSD FRML MDRD: 85 ML/MIN/1.73
GLUCOSE SERPL-MCNC: 99 MG/DL (ref 65–99)
HCT VFR BLD AUTO: 41.6 % (ref 34–46.6)
HGB BLD-MCNC: 13.6 G/DL (ref 12–15.9)
MCH RBC QN AUTO: 31.1 PG (ref 26.6–33)
MCHC RBC AUTO-ENTMCNC: 32.7 G/DL (ref 31.5–35.7)
MCV RBC AUTO: 95.2 FL (ref 79–97)
PLATELET # BLD AUTO: 202 10*3/MM3 (ref 140–450)
PMV BLD AUTO: 11.2 FL (ref 6–12)
POTASSIUM SERPL-SCNC: 4 MMOL/L (ref 3.5–5.2)
QT INTERVAL: 377 MS
RBC # BLD AUTO: 4.37 10*6/MM3 (ref 3.77–5.28)
SARS-COV-2 ORF1AB RESP QL NAA+PROBE: NOT DETECTED
SODIUM SERPL-SCNC: 142 MMOL/L (ref 136–145)
WBC # BLD AUTO: 5.02 10*3/MM3 (ref 3.4–10.8)

## 2021-11-08 PROCEDURE — C9803 HOPD COVID-19 SPEC COLLECT: HCPCS

## 2021-11-08 PROCEDURE — 93010 ELECTROCARDIOGRAM REPORT: CPT | Performed by: INTERNAL MEDICINE

## 2021-11-08 PROCEDURE — 93005 ELECTROCARDIOGRAM TRACING: CPT

## 2021-11-08 PROCEDURE — 85027 COMPLETE CBC AUTOMATED: CPT

## 2021-11-08 PROCEDURE — 80048 BASIC METABOLIC PNL TOTAL CA: CPT

## 2021-11-08 PROCEDURE — 36415 COLL VENOUS BLD VENIPUNCTURE: CPT

## 2021-11-08 PROCEDURE — U0004 COV-19 TEST NON-CDC HGH THRU: HCPCS

## 2021-11-08 RX ORDER — CHLORHEXIDINE GLUCONATE 500 MG/1
CLOTH TOPICAL
COMMUNITY
End: 2021-11-11 | Stop reason: HOSPADM

## 2021-11-08 NOTE — DISCHARGE INSTRUCTIONS

## 2021-11-10 ENCOUNTER — ANESTHESIA EVENT (OUTPATIENT)
Dept: PERIOP | Facility: HOSPITAL | Age: 61
End: 2021-11-10

## 2021-11-10 ENCOUNTER — ANESTHESIA (OUTPATIENT)
Dept: PERIOP | Facility: HOSPITAL | Age: 61
End: 2021-11-10

## 2021-11-10 ENCOUNTER — HOSPITAL ENCOUNTER (OUTPATIENT)
Dept: NUCLEAR MEDICINE | Facility: HOSPITAL | Age: 61
Discharge: HOME | End: 2021-11-10

## 2021-11-10 ENCOUNTER — HOSPITAL ENCOUNTER (OUTPATIENT)
Facility: HOSPITAL | Age: 61
Discharge: HOME | End: 2021-11-11
Attending: SURGERY | Admitting: PLASTIC SURGERY

## 2021-11-10 DIAGNOSIS — D05.12 DUCTAL CARCINOMA IN SITU (DCIS) OF LEFT BREAST WITH COMEDONECROSIS: ICD-10-CM

## 2021-11-10 PROCEDURE — 88331 PATH CONSLTJ SURG 1 BLK 1SPC: CPT | Performed by: SURGERY

## 2021-11-10 PROCEDURE — 25010000002 ONDANSETRON PER 1 MG: Performed by: NURSE ANESTHETIST, CERTIFIED REGISTERED

## 2021-11-10 PROCEDURE — 38525 BIOPSY/REMOVAL LYMPH NODES: CPT | Performed by: PHYSICIAN ASSISTANT

## 2021-11-10 PROCEDURE — 88307 TISSUE EXAM BY PATHOLOGIST: CPT | Performed by: SURGERY

## 2021-11-10 PROCEDURE — G0378 HOSPITAL OBSERVATION PER HR: HCPCS

## 2021-11-10 PROCEDURE — C1789 PROSTHESIS, BREAST, IMP: HCPCS | Performed by: SURGERY

## 2021-11-10 PROCEDURE — 25010000002 GENTAMICIN PER 80 MG: Performed by: PLASTIC SURGERY

## 2021-11-10 PROCEDURE — 63710000001 ONDANSETRON PER 8 MG: Performed by: PLASTIC SURGERY

## 2021-11-10 PROCEDURE — C1889 IMPLANT/INSERT DEVICE, NOC: HCPCS | Performed by: SURGERY

## 2021-11-10 PROCEDURE — 25010000002 CEFOXITIN PER 1 G: Performed by: PLASTIC SURGERY

## 2021-11-10 PROCEDURE — 25010000002 DEXAMETHASONE PER 1 MG: Performed by: NURSE ANESTHETIST, CERTIFIED REGISTERED

## 2021-11-10 PROCEDURE — 19303 MAST SIMPLE COMPLETE: CPT | Performed by: SURGERY

## 2021-11-10 PROCEDURE — 25010000002 FENTANYL CITRATE (PF) 50 MCG/ML SOLUTION: Performed by: NURSE ANESTHETIST, CERTIFIED REGISTERED

## 2021-11-10 PROCEDURE — 19303 MAST SIMPLE COMPLETE: CPT | Performed by: PHYSICIAN ASSISTANT

## 2021-11-10 PROCEDURE — 88332 PATH CONSLTJ SURG EA ADD BLK: CPT | Performed by: SURGERY

## 2021-11-10 PROCEDURE — 38525 BIOPSY/REMOVAL LYMPH NODES: CPT | Performed by: SURGERY

## 2021-11-10 PROCEDURE — 0 TECHETIUM TC99M TILMANOCEPT: Performed by: SURGERY

## 2021-11-10 PROCEDURE — 25010000002 NEOSTIGMINE 5 MG/10ML SOLUTION: Performed by: NURSE ANESTHETIST, CERTIFIED REGISTERED

## 2021-11-10 PROCEDURE — A9520 TC99 TILMANOCEPT DIAG 0.5MCI: HCPCS | Performed by: SURGERY

## 2021-11-10 PROCEDURE — 25010000002 ROPIVACAINE PER 1 MG: Performed by: PLASTIC SURGERY

## 2021-11-10 PROCEDURE — 25010000002 PROPOFOL 10 MG/ML EMULSION: Performed by: NURSE ANESTHETIST, CERTIFIED REGISTERED

## 2021-11-10 PROCEDURE — 38792 RA TRACER ID OF SENTINL NODE: CPT

## 2021-11-10 DEVICE — HORIZON TI SMALL RED 6 CLIPS/CART
Type: IMPLANTABLE DEVICE | Site: BREAST | Status: FUNCTIONAL
Brand: WECK

## 2021-11-10 DEVICE — HORIZON TI MED 6/CART
Type: IMPLANTABLE DEVICE | Site: BREAST | Status: FUNCTIONAL
Brand: WECK

## 2021-11-10 DEVICE — GRFT TISS ALLODERM RTM SELCT 240SQ/CM MD 1.2TO2MM: Type: IMPLANTABLE DEVICE | Site: BREAST | Status: FUNCTIONAL

## 2021-11-10 DEVICE — EXPNDR TISS BRST MODHT XPROJ STL 133S MX/T 500CC: Type: IMPLANTABLE DEVICE | Site: BREAST | Status: FUNCTIONAL

## 2021-11-10 RX ORDER — EPHEDRINE SULFATE 50 MG/ML
5 INJECTION, SOLUTION INTRAVENOUS ONCE AS NEEDED
Status: DISCONTINUED | OUTPATIENT
Start: 2021-11-10 | End: 2021-11-10 | Stop reason: HOSPADM

## 2021-11-10 RX ORDER — OXYCODONE AND ACETAMINOPHEN 10; 325 MG/1; MG/1
1 TABLET ORAL EVERY 4 HOURS PRN
Status: DISCONTINUED | OUTPATIENT
Start: 2021-11-10 | End: 2021-11-10 | Stop reason: HOSPADM

## 2021-11-10 RX ORDER — LABETALOL HYDROCHLORIDE 5 MG/ML
5 INJECTION, SOLUTION INTRAVENOUS
Status: DISCONTINUED | OUTPATIENT
Start: 2021-11-10 | End: 2021-11-10 | Stop reason: HOSPADM

## 2021-11-10 RX ORDER — ESCITALOPRAM OXALATE 10 MG/1
10 TABLET ORAL DAILY
Status: DISCONTINUED | OUTPATIENT
Start: 2021-11-10 | End: 2021-11-11 | Stop reason: HOSPADM

## 2021-11-10 RX ORDER — PROPOFOL 10 MG/ML
VIAL (ML) INTRAVENOUS AS NEEDED
Status: DISCONTINUED | OUTPATIENT
Start: 2021-11-10 | End: 2021-11-10 | Stop reason: SURG

## 2021-11-10 RX ORDER — ROPIVACAINE HYDROCHLORIDE 5 MG/ML
INJECTION, SOLUTION EPIDURAL; INFILTRATION; PERINEURAL AS NEEDED
Status: DISCONTINUED | OUTPATIENT
Start: 2021-11-10 | End: 2021-11-10 | Stop reason: HOSPADM

## 2021-11-10 RX ORDER — FLUMAZENIL 0.1 MG/ML
0.2 INJECTION INTRAVENOUS AS NEEDED
Status: DISCONTINUED | OUTPATIENT
Start: 2021-11-10 | End: 2021-11-10 | Stop reason: HOSPADM

## 2021-11-10 RX ORDER — LIDOCAINE HYDROCHLORIDE 20 MG/ML
INJECTION, SOLUTION INFILTRATION; PERINEURAL AS NEEDED
Status: DISCONTINUED | OUTPATIENT
Start: 2021-11-10 | End: 2021-11-10 | Stop reason: SURG

## 2021-11-10 RX ORDER — ALBUTEROL SULFATE 90 UG/1
1 AEROSOL, METERED RESPIRATORY (INHALATION) EVERY 4 HOURS PRN
Status: DISCONTINUED | OUTPATIENT
Start: 2021-11-10 | End: 2021-11-11 | Stop reason: HOSPADM

## 2021-11-10 RX ORDER — FENTANYL CITRATE 50 UG/ML
INJECTION, SOLUTION INTRAMUSCULAR; INTRAVENOUS AS NEEDED
Status: DISCONTINUED | OUTPATIENT
Start: 2021-11-10 | End: 2021-11-10 | Stop reason: SURG

## 2021-11-10 RX ORDER — MIDAZOLAM HYDROCHLORIDE 1 MG/ML
2 INJECTION INTRAMUSCULAR; INTRAVENOUS ONCE
Status: DISCONTINUED | OUTPATIENT
Start: 2021-11-10 | End: 2021-11-10 | Stop reason: HOSPADM

## 2021-11-10 RX ORDER — OXYCODONE HYDROCHLORIDE 5 MG/1
5 TABLET ORAL EVERY 4 HOURS PRN
Status: DISCONTINUED | OUTPATIENT
Start: 2021-11-10 | End: 2021-11-11 | Stop reason: HOSPADM

## 2021-11-10 RX ORDER — ONDANSETRON 4 MG/1
4 TABLET, FILM COATED ORAL EVERY 6 HOURS PRN
Status: DISCONTINUED | OUTPATIENT
Start: 2021-11-10 | End: 2021-11-11 | Stop reason: HOSPADM

## 2021-11-10 RX ORDER — GLYCOPYRROLATE 0.2 MG/ML
INJECTION INTRAMUSCULAR; INTRAVENOUS AS NEEDED
Status: DISCONTINUED | OUTPATIENT
Start: 2021-11-10 | End: 2021-11-10 | Stop reason: SURG

## 2021-11-10 RX ORDER — GABAPENTIN 300 MG/1
300 CAPSULE ORAL ONCE
Status: DISCONTINUED | OUTPATIENT
Start: 2021-11-10 | End: 2021-11-10 | Stop reason: HOSPADM

## 2021-11-10 RX ORDER — DOXYCYCLINE 100 MG/1
100 CAPSULE ORAL EVERY 12 HOURS SCHEDULED
Status: DISCONTINUED | OUTPATIENT
Start: 2021-11-10 | End: 2021-11-11 | Stop reason: HOSPADM

## 2021-11-10 RX ORDER — DIAZEPAM 5 MG/1
10 TABLET ORAL ONCE AS NEEDED
Status: DISCONTINUED | OUTPATIENT
Start: 2021-11-10 | End: 2021-11-10 | Stop reason: HOSPADM

## 2021-11-10 RX ORDER — FENTANYL CITRATE 50 UG/ML
50 INJECTION, SOLUTION INTRAMUSCULAR; INTRAVENOUS
Status: DISCONTINUED | OUTPATIENT
Start: 2021-11-10 | End: 2021-11-10 | Stop reason: HOSPADM

## 2021-11-10 RX ORDER — DEXMEDETOMIDINE HYDROCHLORIDE 100 UG/ML
INJECTION, SOLUTION INTRAVENOUS AS NEEDED
Status: DISCONTINUED | OUTPATIENT
Start: 2021-11-10 | End: 2021-11-10 | Stop reason: SURG

## 2021-11-10 RX ORDER — DIPHENHYDRAMINE HCL 25 MG
25 CAPSULE ORAL
Status: DISCONTINUED | OUTPATIENT
Start: 2021-11-10 | End: 2021-11-10 | Stop reason: HOSPADM

## 2021-11-10 RX ORDER — ROCURONIUM BROMIDE 10 MG/ML
INJECTION, SOLUTION INTRAVENOUS AS NEEDED
Status: DISCONTINUED | OUTPATIENT
Start: 2021-11-10 | End: 2021-11-10 | Stop reason: SURG

## 2021-11-10 RX ORDER — ONDANSETRON 2 MG/ML
4 INJECTION INTRAMUSCULAR; INTRAVENOUS ONCE AS NEEDED
Status: DISCONTINUED | OUTPATIENT
Start: 2021-11-10 | End: 2021-11-10 | Stop reason: HOSPADM

## 2021-11-10 RX ORDER — HYDRALAZINE HYDROCHLORIDE 20 MG/ML
5 INJECTION INTRAMUSCULAR; INTRAVENOUS
Status: DISCONTINUED | OUTPATIENT
Start: 2021-11-10 | End: 2021-11-10 | Stop reason: HOSPADM

## 2021-11-10 RX ORDER — DIPHENHYDRAMINE HYDROCHLORIDE 50 MG/ML
12.5 INJECTION INTRAMUSCULAR; INTRAVENOUS
Status: DISCONTINUED | OUTPATIENT
Start: 2021-11-10 | End: 2021-11-10 | Stop reason: HOSPADM

## 2021-11-10 RX ORDER — FENTANYL CITRATE 50 UG/ML
100 INJECTION, SOLUTION INTRAMUSCULAR; INTRAVENOUS
Status: DISCONTINUED | OUTPATIENT
Start: 2021-11-10 | End: 2021-11-10 | Stop reason: HOSPADM

## 2021-11-10 RX ORDER — ACETAMINOPHEN 325 MG/1
1000 TABLET ORAL ONCE
Status: COMPLETED | OUTPATIENT
Start: 2021-11-10 | End: 2021-11-10

## 2021-11-10 RX ORDER — ISOSULFAN BLUE 50 MG/5ML
INJECTION, SOLUTION SUBCUTANEOUS AS NEEDED
Status: DISCONTINUED | OUTPATIENT
Start: 2021-11-10 | End: 2021-11-10 | Stop reason: HOSPADM

## 2021-11-10 RX ORDER — MELOXICAM 15 MG/1
15 TABLET ORAL ONCE
Status: COMPLETED | OUTPATIENT
Start: 2021-11-10 | End: 2021-11-10

## 2021-11-10 RX ORDER — SODIUM CHLORIDE, SODIUM LACTATE, POTASSIUM CHLORIDE, CALCIUM CHLORIDE 600; 310; 30; 20 MG/100ML; MG/100ML; MG/100ML; MG/100ML
9 INJECTION, SOLUTION INTRAVENOUS CONTINUOUS
Status: DISCONTINUED | OUTPATIENT
Start: 2021-11-10 | End: 2021-11-10 | Stop reason: HOSPADM

## 2021-11-10 RX ORDER — HYDROMORPHONE HYDROCHLORIDE 1 MG/ML
0.25 INJECTION, SOLUTION INTRAMUSCULAR; INTRAVENOUS; SUBCUTANEOUS
Status: DISCONTINUED | OUTPATIENT
Start: 2021-11-10 | End: 2021-11-11 | Stop reason: HOSPADM

## 2021-11-10 RX ORDER — NALOXONE HCL 0.4 MG/ML
0.1 VIAL (ML) INJECTION
Status: DISCONTINUED | OUTPATIENT
Start: 2021-11-10 | End: 2021-11-11 | Stop reason: HOSPADM

## 2021-11-10 RX ORDER — SODIUM CHLORIDE 0.9 % (FLUSH) 0.9 %
3-10 SYRINGE (ML) INJECTION AS NEEDED
Status: DISCONTINUED | OUTPATIENT
Start: 2021-11-10 | End: 2021-11-10 | Stop reason: HOSPADM

## 2021-11-10 RX ORDER — SODIUM CHLORIDE, SODIUM LACTATE, POTASSIUM CHLORIDE, CALCIUM CHLORIDE 600; 310; 30; 20 MG/100ML; MG/100ML; MG/100ML; MG/100ML
125 INJECTION, SOLUTION INTRAVENOUS CONTINUOUS
Status: DISCONTINUED | OUTPATIENT
Start: 2021-11-10 | End: 2021-11-10 | Stop reason: HOSPADM

## 2021-11-10 RX ORDER — ONDANSETRON 2 MG/ML
INJECTION INTRAMUSCULAR; INTRAVENOUS AS NEEDED
Status: DISCONTINUED | OUTPATIENT
Start: 2021-11-10 | End: 2021-11-10 | Stop reason: SURG

## 2021-11-10 RX ORDER — ONDANSETRON 8 MG/1
8 TABLET, ORALLY DISINTEGRATING ORAL EVERY 6 HOURS PRN
Status: DISCONTINUED | OUTPATIENT
Start: 2021-11-10 | End: 2021-11-11 | Stop reason: HOSPADM

## 2021-11-10 RX ORDER — SODIUM CHLORIDE 0.9 % (FLUSH) 0.9 %
3 SYRINGE (ML) INJECTION EVERY 12 HOURS SCHEDULED
Status: DISCONTINUED | OUTPATIENT
Start: 2021-11-10 | End: 2021-11-11 | Stop reason: HOSPADM

## 2021-11-10 RX ORDER — SODIUM CHLORIDE 0.9 % (FLUSH) 0.9 %
3-10 SYRINGE (ML) INJECTION AS NEEDED
Status: DISCONTINUED | OUTPATIENT
Start: 2021-11-10 | End: 2021-11-11 | Stop reason: HOSPADM

## 2021-11-10 RX ORDER — EPHEDRINE SULFATE 50 MG/ML
INJECTION, SOLUTION INTRAVENOUS AS NEEDED
Status: DISCONTINUED | OUTPATIENT
Start: 2021-11-10 | End: 2021-11-10 | Stop reason: SURG

## 2021-11-10 RX ORDER — GABAPENTIN 100 MG/1
100 CAPSULE ORAL EVERY 8 HOURS SCHEDULED
Status: DISCONTINUED | OUTPATIENT
Start: 2021-11-10 | End: 2021-11-11 | Stop reason: HOSPADM

## 2021-11-10 RX ORDER — ACETAMINOPHEN 500 MG
1000 TABLET ORAL ONCE
Status: DISCONTINUED | OUTPATIENT
Start: 2021-11-10 | End: 2021-11-10 | Stop reason: HOSPADM

## 2021-11-10 RX ORDER — IBUPROFEN 600 MG/1
600 TABLET ORAL ONCE AS NEEDED
Status: DISCONTINUED | OUTPATIENT
Start: 2021-11-10 | End: 2021-11-10 | Stop reason: HOSPADM

## 2021-11-10 RX ORDER — NEOSTIGMINE METHYLSULFATE 0.5 MG/ML
INJECTION, SOLUTION INTRAVENOUS AS NEEDED
Status: DISCONTINUED | OUTPATIENT
Start: 2021-11-10 | End: 2021-11-10 | Stop reason: SURG

## 2021-11-10 RX ORDER — CEFAZOLIN SODIUM 2 G/100ML
2 INJECTION, SOLUTION INTRAVENOUS ONCE
Status: DISCONTINUED | OUTPATIENT
Start: 2021-11-10 | End: 2021-11-10 | Stop reason: HOSPADM

## 2021-11-10 RX ORDER — NALOXONE HCL 0.4 MG/ML
0.2 VIAL (ML) INJECTION AS NEEDED
Status: DISCONTINUED | OUTPATIENT
Start: 2021-11-10 | End: 2021-11-10 | Stop reason: HOSPADM

## 2021-11-10 RX ORDER — PREGABALIN 75 MG/1
75 CAPSULE ORAL ONCE
Status: COMPLETED | OUTPATIENT
Start: 2021-11-10 | End: 2021-11-10

## 2021-11-10 RX ORDER — HYDROCODONE BITARTRATE AND ACETAMINOPHEN 7.5; 325 MG/1; MG/1
1 TABLET ORAL ONCE AS NEEDED
Status: DISCONTINUED | OUTPATIENT
Start: 2021-11-10 | End: 2021-11-10 | Stop reason: HOSPADM

## 2021-11-10 RX ORDER — ONDANSETRON 2 MG/ML
4 INJECTION INTRAMUSCULAR; INTRAVENOUS EVERY 6 HOURS PRN
Status: DISCONTINUED | OUTPATIENT
Start: 2021-11-10 | End: 2021-11-11 | Stop reason: HOSPADM

## 2021-11-10 RX ORDER — SODIUM CHLORIDE 0.9 % (FLUSH) 0.9 %
3 SYRINGE (ML) INJECTION EVERY 12 HOURS SCHEDULED
Status: DISCONTINUED | OUTPATIENT
Start: 2021-11-10 | End: 2021-11-10 | Stop reason: HOSPADM

## 2021-11-10 RX ORDER — ACETAMINOPHEN 325 MG/1
650 TABLET ORAL EVERY 4 HOURS PRN
Status: DISCONTINUED | OUTPATIENT
Start: 2021-11-10 | End: 2021-11-11 | Stop reason: HOSPADM

## 2021-11-10 RX ORDER — TRANEXAMIC ACID 100 MG/ML
INJECTION, SOLUTION INTRAVENOUS AS NEEDED
Status: DISCONTINUED | OUTPATIENT
Start: 2021-11-10 | End: 2021-11-10 | Stop reason: HOSPADM

## 2021-11-10 RX ORDER — LIDOCAINE HYDROCHLORIDE 10 MG/ML
0.5 INJECTION, SOLUTION EPIDURAL; INFILTRATION; INTRACAUDAL; PERINEURAL ONCE AS NEEDED
Status: DISCONTINUED | OUTPATIENT
Start: 2021-11-10 | End: 2021-11-10 | Stop reason: HOSPADM

## 2021-11-10 RX ORDER — FENTANYL CITRATE 50 UG/ML
50 INJECTION, SOLUTION INTRAMUSCULAR; INTRAVENOUS ONCE
Status: DISCONTINUED | OUTPATIENT
Start: 2021-11-10 | End: 2021-11-10 | Stop reason: HOSPADM

## 2021-11-10 RX ORDER — OXYCODONE HYDROCHLORIDE 5 MG/1
10 TABLET ORAL ONCE
Status: COMPLETED | OUTPATIENT
Start: 2021-11-10 | End: 2021-11-10

## 2021-11-10 RX ORDER — MAGNESIUM HYDROXIDE 1200 MG/15ML
LIQUID ORAL AS NEEDED
Status: DISCONTINUED | OUTPATIENT
Start: 2021-11-10 | End: 2021-11-10 | Stop reason: HOSPADM

## 2021-11-10 RX ORDER — HYDROMORPHONE HYDROCHLORIDE 1 MG/ML
0.5 INJECTION, SOLUTION INTRAMUSCULAR; INTRAVENOUS; SUBCUTANEOUS
Status: DISCONTINUED | OUTPATIENT
Start: 2021-11-10 | End: 2021-11-10 | Stop reason: HOSPADM

## 2021-11-10 RX ORDER — ONDANSETRON HYDROCHLORIDE 8 MG/1
8 TABLET, FILM COATED ORAL ONCE
Status: COMPLETED | OUTPATIENT
Start: 2021-11-10 | End: 2021-11-10

## 2021-11-10 RX ORDER — DEXAMETHASONE SODIUM PHOSPHATE 10 MG/ML
INJECTION INTRAMUSCULAR; INTRAVENOUS AS NEEDED
Status: DISCONTINUED | OUTPATIENT
Start: 2021-11-10 | End: 2021-11-10 | Stop reason: SURG

## 2021-11-10 RX ORDER — MIDAZOLAM HYDROCHLORIDE 1 MG/ML
1 INJECTION INTRAMUSCULAR; INTRAVENOUS
Status: DISCONTINUED | OUTPATIENT
Start: 2021-11-10 | End: 2021-11-10 | Stop reason: HOSPADM

## 2021-11-10 RX ADMIN — ROCURONIUM BROMIDE 10 MG: 50 INJECTION INTRAVENOUS at 09:29

## 2021-11-10 RX ADMIN — ONDANSETRON HYDROCHLORIDE 8 MG: 8 TABLET, FILM COATED ORAL at 06:37

## 2021-11-10 RX ADMIN — GLYCOPYRROLATE 0.2 MG: 0.2 INJECTION INTRAMUSCULAR; INTRAVENOUS at 11:06

## 2021-11-10 RX ADMIN — PROPOFOL 150 MG: 10 INJECTION, EMULSION INTRAVENOUS at 08:03

## 2021-11-10 RX ADMIN — ACETAMINOPHEN 975 MG: 325 TABLET, FILM COATED ORAL at 06:38

## 2021-11-10 RX ADMIN — DIAZEPAM 10 MG: 5 TABLET ORAL at 06:38

## 2021-11-10 RX ADMIN — EPHEDRINE SULFATE 10 MG: 50 INJECTION INTRAVENOUS at 10:51

## 2021-11-10 RX ADMIN — DOXYCYCLINE 200 MG: 100 INJECTION, POWDER, LYOPHILIZED, FOR SOLUTION INTRAVENOUS at 07:52

## 2021-11-10 RX ADMIN — GLYCOPYRROLATE 0.2 MG: 0.2 INJECTION INTRAMUSCULAR; INTRAVENOUS at 08:45

## 2021-11-10 RX ADMIN — SODIUM CHLORIDE, POTASSIUM CHLORIDE, SODIUM LACTATE AND CALCIUM CHLORIDE: 600; 310; 30; 20 INJECTION, SOLUTION INTRAVENOUS at 09:08

## 2021-11-10 RX ADMIN — LIDOCAINE HYDROCHLORIDE 60 MG: 20 INJECTION, SOLUTION INFILTRATION; PERINEURAL at 08:03

## 2021-11-10 RX ADMIN — DOXYCYCLINE 100 MG: 100 CAPSULE ORAL at 21:11

## 2021-11-10 RX ADMIN — DEXMEDETOMIDINE 4 MCG: 100 INJECTION, SOLUTION, CONCENTRATE INTRAVENOUS at 09:30

## 2021-11-10 RX ADMIN — NEOSTIGMINE METHYLSULFATE 2 MG: 0.5 INJECTION INTRAVENOUS at 11:06

## 2021-11-10 RX ADMIN — EPHEDRINE SULFATE 10 MG: 50 INJECTION INTRAVENOUS at 10:41

## 2021-11-10 RX ADMIN — FENTANYL CITRATE 25 MCG: 0.05 INJECTION, SOLUTION INTRAMUSCULAR; INTRAVENOUS at 09:25

## 2021-11-10 RX ADMIN — ROCURONIUM BROMIDE 40 MG: 50 INJECTION INTRAVENOUS at 08:03

## 2021-11-10 RX ADMIN — ACETAMINOPHEN 650 MG: 325 TABLET, FILM COATED ORAL at 15:04

## 2021-11-10 RX ADMIN — MELOXICAM 15 MG: 15 TABLET ORAL at 06:38

## 2021-11-10 RX ADMIN — SODIUM CHLORIDE, POTASSIUM CHLORIDE, SODIUM LACTATE AND CALCIUM CHLORIDE: 600; 310; 30; 20 INJECTION, SOLUTION INTRAVENOUS at 10:53

## 2021-11-10 RX ADMIN — FENTANYL CITRATE 25 MCG: 0.05 INJECTION, SOLUTION INTRAMUSCULAR; INTRAVENOUS at 08:24

## 2021-11-10 RX ADMIN — EPHEDRINE SULFATE 5 MG: 50 INJECTION INTRAVENOUS at 10:36

## 2021-11-10 RX ADMIN — SODIUM CHLORIDE, POTASSIUM CHLORIDE, SODIUM LACTATE AND CALCIUM CHLORIDE 9 ML/HR: 600; 310; 30; 20 INJECTION, SOLUTION INTRAVENOUS at 06:37

## 2021-11-10 RX ADMIN — OXYCODONE 10 MG: 5 TABLET ORAL at 06:38

## 2021-11-10 RX ADMIN — PREGABALIN 75 MG: 75 CAPSULE ORAL at 06:38

## 2021-11-10 RX ADMIN — ONDANSETRON 4 MG: 2 INJECTION INTRAMUSCULAR; INTRAVENOUS at 10:52

## 2021-11-10 RX ADMIN — FENTANYL CITRATE 50 MCG: 0.05 INJECTION, SOLUTION INTRAMUSCULAR; INTRAVENOUS at 11:11

## 2021-11-10 RX ADMIN — GABAPENTIN 100 MG: 100 CAPSULE ORAL at 21:11

## 2021-11-10 RX ADMIN — DEXMEDETOMIDINE 4 MCG: 100 INJECTION, SOLUTION, CONCENTRATE INTRAVENOUS at 07:59

## 2021-11-10 RX ADMIN — TILMANOCEPT 1 DOSE: KIT at 07:20

## 2021-11-10 RX ADMIN — GABAPENTIN 100 MG: 100 CAPSULE ORAL at 15:04

## 2021-11-10 RX ADMIN — PROPOFOL 25 MCG/KG/MIN: 10 INJECTION, EMULSION INTRAVENOUS at 08:23

## 2021-11-10 RX ADMIN — DEXAMETHASONE SODIUM PHOSPHATE 8 MG: 10 INJECTION INTRAMUSCULAR; INTRAVENOUS at 08:13

## 2021-11-10 RX ADMIN — SODIUM CHLORIDE, PRESERVATIVE FREE 3 ML: 5 INJECTION INTRAVENOUS at 21:00

## 2021-11-10 RX ADMIN — PROPOFOL 50 MG: 10 INJECTION, EMULSION INTRAVENOUS at 08:05

## 2021-11-10 NOTE — OP NOTE
Pre-Operative Diagnosis: Acquired absence bilateral breast     Post-Operative Diagnosis: Same     Procedure Performed:   1.  Bilateral placement of prepectoral tissue expanders for reconstruction ([id number])  2.  Bilateral placement AlloDerm acellular dermal matrix (10 x 12 in each breast)     Surgeon: JERRY Yoder MD     Assistant: None     Anesthesia: General     Estimated Blood Loss: 20     Specimens: None     Complications: None     Indications: She presented after diagnosis of left breast cancer.  She discussed her treatment with Dr. Dillard and concerned mastectomy on the left and risk reducing mastectomy on the right.       We discussed risks, benefits and alternatives including but not limited to: bleeding, infection, asymmetry, poor or slow wound healing, need for further surgery, possible recurrence.  The patient elected to proceed.     Description of Procedure: The patient was met in the preoperative holding area.  All questions were answered and informed consent was assured.       She was marked in a standing position of the breast landmarks were drawn and fusiform mastectomy incisions were designed around the nipple areolar complexes preserving the inferior skin.  She was transferred to the operating placed supine on table with arms abducted and padded.     After induction of appropriate anesthesia, a timeout was performed correctly identifying the patient, operative site, and procedure to be performed.  All present were in agreement.     After completion of the mastectomies the skin flaps did appear viable but there was a thin area near the lateral left superior edge that had a small area of exposed dermis.  The superior flap was transposed over the inferior flap loosely and this overlap was marked.  The intervening skin was then de-epithelialized. This did provide some coverage to the area of exposed dermis.  Both breasts were copiously irrigated and immaculate hemostasis was achieved.  50%  Betadine solution was left to dwell for several minutes and rinsed clear with antibiotic solution.  Gloves were changed and the AlloDerm and expanders were brought on the field and the large AlloDerm sheet was split and then additionally fenestrated and anterior wraps created around both expanders.  Expanders were placed in the pockets after changing gloves and reprepped and the skin.  The expanders were sutured in 4 locations in the inferior de-epithelialized flap transposed and secured on the anterior surface of the expander. 2 drains were placed on both sides.  Topical TXA was placed throughout the pockets. Tthen closure performed with 2-0 Vicryl in the subcutaneous layer anchoring the de-epithelialized flap to the underside of the superior flap, 3-0 Monocryl deep dermal layer and 4-0 Monocryl in the intracuticular.       Skin was dressed with skin glue and Dermabond and Nitropaste applied and she was placed in a well-padded Ace wrap.     The patient was then aroused from anesthesia with ease and transferred to the postoperative care area in good condition. All sponge, needle, and instrument counts were correct.

## 2021-11-10 NOTE — OP NOTE
SURGERY  Operative Note :  GILMA Wood  1960    Procedure Date: 11/10/21    Pre-op Diagnosis:  · Left breast DCIS, with comedonecrosis, high-grade, estimated 1.5 cm by MRI, 230, retroareolar, weakly ER/FL positive, clinically stage 0.    Post-op Diagnosis:  · same    Procedure:   · Bilateral mastectomy, skin sparing, with left axillary lymphatic tracing and sentinel node biopsy.    Surgeon: Gilma    Assistant: Christiano Jaramillo.  He assisted with skin retraction, exposure, suction, irrigating and was critical to a safe and expeditious procedure    Indications:  · As above    Findings:   · Flaps relatively thin but very well perfused on the right side, less so on the left.  No distinct areas of devascularization.  · Nemacolin nodes 515, with counts by the neoprobe ex vivo of 4100, and axillary counts thereafter less than 100.  blue coloration noted.  Frozen section was negative on what was thought to be one sentinel node that was actually 3.  There was some question of architectural disorder and that will be sent for additional studies.    Recommendations:   · Will  regarding path postoperatively, including the architectural distortion  · Will offer her a visit with genetic counseling during her recuperation phase, but will not encourage that as she had considerable anxiety surrounding discussion and ordering of that previously.    Associated Issues:  · Detected on screening MRI.  This is making her nervous about the ability to detect cancer in her.  · Sister with metachronous bilateral breast cancer, triple negative, diagnosed at age 37, requiring chemotherapy.  She initially underwent a lumpectomy on the first breast.  · Mother with lymph node positive breast cancer, age 55, death from possible complications of neoadjuvant therapy  · AnxietyDetected on screening MRI.  This is making her nervous about the ability to detect cancer in her.  · Sister with metachronous bilateral breast cancer, triple  negative, diagnosed at age 37, requiring chemotherapy.  She initially underwent a lumpectomy on the first breast.  · Mother with lymph node positive breast cancer, age 55, death from possible complications of neoadjuvant therapy  · Anxiety    Technique:     The patient went to radiology were technician sulfur colloid was injected at the areolar border on the left, then to the operating room were general anesthetic was induced.  Bilateral breasts were prepped with Hibiclens and draped sterilely.  5 ml of Lymphazurin was injected under the areolar border on the left.  Dr Pito Yoder marked out the inframammary creases and the desired skin edges for a skin sparing approach.    Elliptical excisions were made around the areola, first on the left, then later on the right.  Dissection was carried out beginning at the superior medial aspect on the left, lifting with the Laureano's.  The dissection superiorly on the right was notable for there being a fairly thin plane, with the plain between the skin and the breast being moderately well-defined.   I went up superiorly to the clavicle medially to the sternum inferiorly to the rectus and laterally to the anterior aspect of the serratus.  This was very tedious as I did this with a skin sparing approach, and lead to a lot of re-orientation and circumferential work.  I then scored the tissue superficial to the pectoralis fascia at the most radial aspect of the dissection circumferentially.     We then addressed the sentinel node biopsy, incising the clavipectoral fascia to get to the deep axillary nodes, through the existing excision lifting out to the axilla.  The neoprobe counts were about 515 externally, internally about that same level.  Lympatic visualization was possible, with the use of loops for magnification.  The lymph node itself was identified, and the lymphatics clipped proximally and distally.  Upon resection it seemed to be one lymph node but ultimately on  pathology was found to be 3.  There was also some dense tissue adjacent to that that I sent that appeared to have coloration that in fact turned out to be fibrofatty tissue..  Frozen section was obtained.    We then went to the opposite side and carried out our dissection similarly.  However, on this side, i did not carry out a sentinel node biopsy.  Both breasts were weighed and marked at the 12:00 location for systems for Dr Pito Yoder for his reconstruction.  He will now proceed with that reconstruction.    Dede Dillard MD

## 2021-11-10 NOTE — PLAN OF CARE
Goal Outcome Evaluation:  Plan of Care Reviewed With: patient, spouse, daughter        Progress: improving  Outcome Summary: loretta chest inc with dermabond, tegaderm, kerlex and ace wrap. JEFE x 4 with min output and drain care education started with pt and . voiding without diff. no appetite, but wallace liquids. PO meds with adeq pain control

## 2021-11-10 NOTE — ANESTHESIA PROCEDURE NOTES
Airway  Urgency: elective    Date/Time: 11/10/2021 8:06 AM  Airway not difficult    General Information and Staff    Patient location during procedure: OR  Anesthesiologist: Elie Prado MD  CRNA: Linda Jeffery CRNA    Indications and Patient Condition  Indications for airway management: airway protection    Preoxygenated: yes  MILS maintained throughout  Mask difficulty assessment: 1 - vent by mask    Final Airway Details  Final airway type: endotracheal airway      Successful airway: ETT  Cuffed: yes   Successful intubation technique: direct laryngoscopy  Facilitating devices/methods: anterior pressure/BURP  Endotracheal tube insertion site: oral  Blade: Naylor  Blade size: 2  ETT size (mm): 7.0  Cormack-Lehane Classification: grade I - full view of glottis  Placement verified by: chest auscultation and capnometry   Cuff volume (mL): 8  Measured from: lips  ETT/EBT  to lips (cm): 21  Number of attempts at approach: 1  Assessment: lips, teeth, and gum same as pre-op and atraumatic intubation    Additional Comments  Pt preoxygenated, SIVI, bag mask vent, ATETI, dentition as before

## 2021-11-10 NOTE — H&P
SURGERY  Jessica Wood   1960  11/10/21       Chief Complaint:  Newly diagnosed breast cancer     HPI    Here for bilateral mastectomies with left axillary sentinel node biopsy and possible axillary dissection for DCIS detected on MRI with very strong history of breast cancer.     Ms. Wood is a nice 60 y.o. female who presents at the referral of Dr Venegas with a breast cancer, detected on screening MRI.  Mammogram offset approx 6 months prior was negative.  She gets this yearly due to a strong family history of breast cancer.  Done 21, it showed a 1.5 cm irregular area of nonmass enhancement, new from prios MRI and suspicious, in the retro areolar left breast, 5.2 cm from the nipple.  The axillas appeared normal.     Biopsy 9/15/21 with clip showed DCIS, high nuclear grade with solid cribriform and central comedo type necrosis, ER low +, NE weak 2% +.  Diagnostic mammogram post biopsy showed the clip at about 2:30 location.      Her family history is notable for a mother (Martha Scott), and sister (Jordyn eBach) with breast cancer, both whom i cared for, her sister in her 30's and her mother with lymph node positive disease.  Her sister had bilateral metachronous cancers and her mother  from myelodysplasia, felt due to neoadjuvant treatment and thus Jessica is significantly concerned about inadequately treating this and then requiring additional intervention that may put her at risk at some sort of blood disorder.  She is also concerned about getting a second cancer on the opposite side just like her sister dead, all of this understandable.  In addition this is developed while she has been on Evista.  She is also concerned because it did not show up on the mammogram.      She comes in and within the first 3 minutes of our visit tells me that she just wants both breasts off.  Unfortunately her father just passed away about 2 weeks ago, her mother 2 years ago and so she has been under a great deal of  stress.  She would definitely want reconstruction.     Her sister did get genetic testing she believes and was negative but she does not believe that her mother did.  I tried to look up her records but she has been passed away long enough that I cannot access them.     She does suffer from anxiety and is on xanax, buspar, and lexapro.      Medical History        Past Medical History:   Diagnosis Date   • Allergic rhinitis     • Anxiety     • Breast cancer (CMS/HCC)    • Hypothyroidism     • Thyroid nodule 2012     Thyroid removed         Surgical History         Past Surgical History:   Procedure Laterality Date   • BREAST BIOPSY Left    •  SECTION N/A    • TOTAL THYROIDECTOMY                  Family History   Problem Relation Age of Onset   • Breast cancer Mother     • Breast cancer Sister     • Miscarriages / Stillbirths Sister        Social History   Social History            Socioeconomic History   • Marital status:        Spouse name: Not on file   • Number of children: Not on file   • Years of education: Not on file   • Highest education level: Not on file   Tobacco Use   • Smoking status: Never Smoker   • Smokeless tobacco: Never Used   Vaping Use   • Vaping Use: Never used   Substance and Sexual Activity   • Alcohol use: Yes       Alcohol/week: 2.0 standard drinks       Types: 2 Glasses of wine per week   • Drug use: Never   • Sexual activity: Yes       Partners: Male       Birth control/protection: Post-menopausal               Current Outpatient Medications:   •  albuterol (PROAIR HFA) 108 (90 BASE) MCG/ACT inhaler, ProAir  (90 Base) MCG/ACT Inhalation Aerosol Solution; Patient Sig: ProAir  (90 Base) MCG/ACT Inhalation Aerosol Solution INHALE 1 PUFF EVERY 4 HOURS AS NEEDED; 1; 3; -2015; Active, Disp: , Rfl:   •  Cholecalciferol (VITAMIN D) 2000 UNITS capsule, Take 2,000 Units by mouth Daily., Disp: , Rfl:   •  escitalopram (LEXAPRO) 10 MG tablet, TAKE  "1 TABLET BY MOUTH EVERY DAY (Patient taking differently: Take 20 mg by mouth Daily.), Disp: 30 tablet, Rfl: 1  •  fexofenadine (ALLEGRA ALLERGY) 180 MG tablet, Take 180 mg by mouth Daily., Disp: , Rfl:   •  multivitamin (THERAGRAN) tablet tablet, Take 1 tablet by mouth Daily., Disp: , Rfl:   •  Omega-3 Fatty Acids (FISH OIL) 1000 MG capsule capsule, Take  by mouth., Disp: , Rfl:   •  raloxifene (EVISTA) 60 MG tablet, TAKE 1 TABLET BY MOUTH DAILY, Disp: , Rfl: 12  •  Tirosint 137 MCG capsule, Take 137 mcg by mouth Daily., Disp: , Rfl:   •  vitamin B-12 (CYANOCOBALAMIN) 100 MCG tablet, Take 100 mcg by mouth Daily., Disp: , Rfl:            Allergies   Allergen Reactions   • Sulfa Antibiotics Hives       Hives, itching, swelling   • Latex Hives       Hives and itching         Review of Systems  Positive for dermatographia, anxiety.  All other systems reviewed and negative  Vitals       Vitals:     09/22/21 1057   Weight: 73 kg (161 lb)   Height: 172.7 cm (67.99\")            PHYSICAL EXAM:     Ht 172.7 cm (67.99\")   Wt 73 kg (161 lb)   BMI 24.49 kg/m²   Body mass index is 24.49 kg/m².     Constitutional: well developed, well nourished, very attractive, appears younger than stated age  Eyes: sclera nonicteric, conjunctiva not injected   ENMT: Hearing intact, trachea midline, dentitia not seen with mask in place  CVS: RRR, no murmur, peripheral edema not present  Respiratory: CTA, normal respiratory effort   Gastrointestinal: no hepatosplenomegaly, abdomen soft, nontender  Genitourinary: inguinal hernia not present  Musculoskeletal: gait normal, muscle mass normal  Skin: warm and dry, no rashes visible.  Of good quality  Neurological: awake and alert, seems to have reasonable capacity for understanding for medical decision making  Psychiatric: appears to have reasonable judgement   Lymphatics: no cervical adenopathy or axillary adenopathy.  Breasts: With a bruise that is more dramatic on the medial breast than the " lateral, the approach being lateral.  She indicates she is a C cup, but it appears more like a B cup and I think the breast was small enough that the point actually probably hit the dermis on the other side leading to a more dramatic bruise medially.  There is firmness here and it makes it difficult to really examine her but there is no skin dimpling or nipple abnormalities on either side.     Radiographic Findings: I reviewed and there is a hematoma at the clip site as is fairly common     Lab: Pathology as above     Pamphlet reviewed: Breast cancer     IMPRESSION:  · Left breast DCIS, with comedonecrosis, high-grade, estimated 1.5 cm by MRI, 230, retroareolar, weakly ER/WA positive, clinically stage 0.  · Detected on screening MRI.  This is making her nervous about the ability to detect cancer in her.  · Sister with metachronous bilateral breast cancer, triple negative, diagnosed at age 37, requiring chemotherapy.  She initially underwent a lumpectomy on the first breast.  · Mother with lymph node positive breast cancer, age 55, death from possible complications of neoadjuvant therapy  · Anxiety     PLAN:  · Patient really would like to get bilateral mastectomies with reconstruction.  I have tried to talk her out of that in some ways telling her that the data does not support that a mastectomy will lengthen her lifetime at all and a lumpectomy with radiation would certainly be adequate for the left breast.  A mastectomy for the left breast only does not seem reasonable since she would still have the risk of the other breast.  Her anxiety is such that she just really believes she will have difficult time without having treatment of both breasts.  · Genetic testing.  Suggested that she had the [employer] testing today.  I did not suggest that I thought she would be positive honestly, since her sister was negative but still felt it would be appropriate to proceed  · Refer her to plastic surgery for evaluation of  reconstruction.  I think she be a very good candidate and she is quite thin and does not smoke.  · Likely will opt for bilateral mastectomy (skin sparing hopefully) with left axillary sentinel node, with decision on frozen section made intraoperatively but likely for permanent only.  Immediate reconstruction.  · Schedule for about 4 weeks from now so that we can be certain that Covid will be passed and we will be able to proceed with the surgery.  She certainly has time to think about her options     Dede Dillard MD

## 2021-11-10 NOTE — ANESTHESIA POSTPROCEDURE EVALUATION
Patient: Jessica Wood    Procedure Summary     Date: 11/10/21 Room / Location: [facility] OR [address] [place] [address] MAIN OR    Anesthesia Start: 0757 Anesthesia Stop: 1125    Procedures:       bilateral MASTECTOMY WITH SENTINEL NODE BIOPSY (Bilateral Breast)      BILATERAL PLACEMENT OF TISSUE EXPANDERS AND ALLODERM (Bilateral Breast) Diagnosis:       Ductal carcinoma in situ (DCIS) of left breast with comedonecrosis      (Ductal carcinoma in situ (DCIS) of left breast with comedonecrosis [D05.12])    Surgeons: Dede Dillard MD; Last Yoder MD Provider: Brett Chery MD    Anesthesia Type: general ASA Status: 2          Anesthesia Type: general    Vitals  Vitals Value Taken Time   /66 11/10/21 1201   Temp 36.8 °C (98.2 °F) 11/10/21 1200   Pulse 97 11/10/21 1203   Resp 14 11/10/21 1200   SpO2 97 % 11/10/21 1203   Vitals shown include unvalidated device data.        Post Anesthesia Care and Evaluation    Patient location during evaluation: PACU  Patient participation: complete - patient participated  Level of consciousness: awake and alert  Pain management: adequate  Airway patency: patent  Anesthetic complications: No anesthetic complications  PONV Status: none  Cardiovascular status: acceptable and hemodynamically stable  Respiratory status: acceptable, nonlabored ventilation and spontaneous ventilation  Hydration status: acceptable

## 2021-11-10 NOTE — ANESTHESIA PREPROCEDURE EVALUATION
Anesthesia Evaluation     Patient summary reviewed and Nursing notes reviewed   NPO Solid Status: > 8 hours             Airway   Mallampati: II  TM distance: >3 FB  Neck ROM: full  no difficulty expected  Dental - normal exam     Pulmonary - normal exam   (+) asthma,  Cardiovascular - negative cardio ROS and normal exam        Neuro/Psych  (+) psychiatric history Anxiety,     GI/Hepatic/Renal/Endo    (+)   thyroid problem hypothyroidism    Musculoskeletal (-) negative ROS    Abdominal  - normal exam   Substance History - negative use     OB/GYN negative ob/gyn ROS         Other                        Anesthesia Plan    ASA 2     general     intravenous induction     Anesthetic plan, all risks, benefits, and alternatives have been provided, discussed and informed consent has been obtained with: patient.    Plan discussed with CRNA.

## 2021-11-11 ENCOUNTER — READMISSION MANAGEMENT (OUTPATIENT)
Dept: CALL CENTER | Facility: HOSPITAL | Age: 61
End: 2021-11-11

## 2021-11-11 VITALS
RESPIRATION RATE: 18 BRPM | HEIGHT: 68 IN | DIASTOLIC BLOOD PRESSURE: 64 MMHG | HEART RATE: 75 BPM | BODY MASS INDEX: 25.2 KG/M2 | TEMPERATURE: 97.2 F | WEIGHT: 166.3 LBS | SYSTOLIC BLOOD PRESSURE: 104 MMHG | OXYGEN SATURATION: 97 %

## 2021-11-11 LAB
LAB AP CASE REPORT: NORMAL
LAB AP DIAGNOSIS COMMENT: NORMAL
LAB AP SYNOPTIC CHECKLIST: NORMAL
Lab: NORMAL
PATH REPORT.FINAL DX SPEC: NORMAL
PATH REPORT.GROSS SPEC: NORMAL

## 2021-11-11 PROCEDURE — G0378 HOSPITAL OBSERVATION PER HR: HCPCS

## 2021-11-11 PROCEDURE — 25010000002 ENOXAPARIN PER 10 MG: Performed by: PLASTIC SURGERY

## 2021-11-11 PROCEDURE — 94799 UNLISTED PULMONARY SVC/PX: CPT

## 2021-11-11 RX ORDER — DOXYCYCLINE 100 MG/1
100 CAPSULE ORAL 2 TIMES DAILY
Qty: 10 CAPSULE | Refills: 0 | Status: SHIPPED | OUTPATIENT
Start: 2021-11-11 | End: 2021-11-16

## 2021-11-11 RX ORDER — ONDANSETRON 8 MG/1
8 TABLET, ORALLY DISINTEGRATING ORAL EVERY 8 HOURS PRN
Qty: 10 TABLET | Refills: 1 | Status: SHIPPED | OUTPATIENT
Start: 2021-11-11 | End: 2022-02-11

## 2021-11-11 RX ORDER — ACETAMINOPHEN 325 MG/1
650 TABLET ORAL EVERY 4 HOURS PRN
Qty: 60 TABLET | Refills: 0 | Status: SHIPPED | OUTPATIENT
Start: 2021-11-11 | End: 2022-02-11

## 2021-11-11 RX ORDER — AMOXICILLIN 250 MG
2 CAPSULE ORAL DAILY PRN
Qty: 30 TABLET | Refills: 1 | Status: SHIPPED | OUTPATIENT
Start: 2021-11-11 | End: 2022-02-11

## 2021-11-11 RX ORDER — GABAPENTIN 100 MG/1
100 CAPSULE ORAL EVERY 8 HOURS
Qty: 60 CAPSULE | Refills: 2 | Status: SHIPPED | OUTPATIENT
Start: 2021-11-11 | End: 2022-02-11

## 2021-11-11 RX ORDER — DOCUSATE SODIUM 250 MG
250 CAPSULE ORAL 2 TIMES DAILY PRN
Qty: 60 CAPSULE | Refills: 1 | Status: SHIPPED | OUTPATIENT
Start: 2021-11-11 | End: 2022-02-11

## 2021-11-11 RX ORDER — HYDROCODONE BITARTRATE AND ACETAMINOPHEN 5; 325 MG/1; MG/1
1-2 TABLET ORAL EVERY 4 HOURS PRN
Qty: 20 TABLET | Refills: 0 | Status: SHIPPED | OUTPATIENT
Start: 2021-11-11 | End: 2022-02-11

## 2021-11-11 RX ADMIN — ENOXAPARIN SODIUM 40 MG: 40 INJECTION SUBCUTANEOUS at 09:12

## 2021-11-11 RX ADMIN — ESCITALOPRAM 10 MG: 10 TABLET, FILM COATED ORAL at 09:13

## 2021-11-11 RX ADMIN — GABAPENTIN 100 MG: 100 CAPSULE ORAL at 06:23

## 2021-11-11 RX ADMIN — SODIUM CHLORIDE, PRESERVATIVE FREE 3 ML: 5 INJECTION INTRAVENOUS at 09:14

## 2021-11-11 RX ADMIN — DOXYCYCLINE 100 MG: 100 CAPSULE ORAL at 09:12

## 2021-11-11 NOTE — NURSING NOTE
Provided patient education on drain and incision care, pt. Returned demonstration of draining milking and emptying. Drain care supplies given at discharge along with a softtee camisole.  VSS remained stable, pt. Ambulating independetly to bathroom and voiding freely. Pt. Is excited and eager to get home.

## 2021-11-11 NOTE — DISCHARGE SUMMARY
Discharge Summary    Patient name: Jessica Wood    Medical record number: [id number]    Admission date: 11/10/2021  Discharge date:  11/11/2021    Attending physician: Dr. Dede Dillard    Primary care physician: Tyler Venegas MD    Consulting physician(s):  Dr Yoder    Primary Diagnoses:    · DCIS, left breast    Secondary Diagnoses:     · Hypothyroidism  · anxiety     Procedure/Proc Date:      · Bilateral mastectomies, left axillary sentinel node biopsy: 11/10/2021    Hospital Course:   The patient is a very pleasant 61 y.o. female that was admitted to the hospital on 11/10/2021 for the aforementioned surgery.  This was uneventful and the sentinel node was negative for a breast metastases.  Additional studies are underway due to some minor changes in the lymph node and we await the final path as usual.    She did well post op and is ready for discharge on POD1.  .      Pathology:   · pending    Discharge medications:      Your medication list      START taking these medications      Instructions Last Dose Given Next Dose Due   acetaminophen 325 MG tablet  Commonly known as: TYLENOL      Take 2 tablets by mouth Every 4 (Four) Hours As Needed for Mild Pain .       docusate sodium 250 MG capsule  Commonly known as: COLACE      Take 1 capsule by mouth 2 (Two) Times a Day As Needed for Constipation.       doxycycline 100 MG capsule  Commonly known as: MONODOX      Take 1 capsule by mouth 2 (Two) Times a Day for 5 days.       gabapentin 100 MG capsule  Commonly known as: Neurontin      Take 1 capsule by mouth Every 8 (Eight) Hours.       HYDROcodone-acetaminophen 5-325 MG per tablet  Commonly known as: NORCO      Take 1-2 tablets by mouth Every 4 (Four) Hours As Needed (Pain).       ondansetron ODT 8 MG disintegrating tablet  Commonly known as: Zofran ODT      Place 1 tablet on the tongue Every 8 (Eight) Hours As Needed for Nausea or Vomiting.       sennosides-docusate 8.6-50 MG per tablet  Commonly known as:  PERICOLACE      Take 2 tablets by mouth Daily As Needed for Constipation.          CHANGE how you take these medications      Instructions Last Dose Given Next Dose Due   escitalopram 10 MG tablet  Commonly known as: LEXAPRO  What changed: how much to take      TAKE 1 TABLET BY MOUTH EVERY DAY          CONTINUE taking these medications      Instructions Last Dose Given Next Dose Due   Allegra Allergy 180 MG tablet  Generic drug: fexofenadine      Take 180 mg by mouth Daily.       ProAir  (90 Base) MCG/ACT inhaler  Generic drug: albuterol sulfate HFA      Inhale 1 puff Every 4 (Four) Hours As Needed.       raloxifene 60 MG tablet  Commonly known as: EVISTA      Take 60 mg by mouth Daily.       Tirosint 137 MCG capsule  Generic drug: levothyroxine sodium      Take 137 mcg by mouth Every Night.       vitamin B-12 100 MCG tablet  Commonly known as: CYANOCOBALAMIN      Take 100 mcg by mouth Daily.          STOP taking these medications    Chlorhexidine Gluconate Cloth 2 % pads        multivitamin tablet tablet        Vitamin D 50 MCG (2000 UT) capsule              Where to Get Your Medications      These medications were sent to [facility]/pharmacy #[id number] - [place], [place] - [address] AT [facility] - [phone] [facility] [phone]   [address] [facility], [place] [address]    Phone: [phone]   · acetaminophen 325 MG tablet  · docusate sodium 250 MG capsule  · doxycycline 100 MG capsule  · gabapentin 100 MG capsule  · HYDROcodone-acetaminophen 5-325 MG per tablet  · ondansetron ODT 8 MG disintegrating tablet  · sennosides-docusate 8.6-50 MG per tablet         Discharge diet:  · regular    Recommendations:    · Follow up in the office  · We will call with pathology when available.        Dede Dillard MD  Office Number: [phone].

## 2021-11-11 NOTE — PROGRESS NOTES
Case Management Discharge Note      Final Note: Discharged home. Rozina Burch, SWETHA         Transportation Services  Private: Car    Final Discharge Disposition Code: 01 - home or self-care

## 2021-11-11 NOTE — PROGRESS NOTES
Continued Stay Note  [facility]     Patient Name: Jessica Wood  MRN: [id number]  Today's Date: 11/11/2021    Admit Date: 11/10/2021     Discharge Plan     Row Name 11/11/21 1005       Plan    Plan Home no needs    Plan Comments Discharge order noted. Met with patient who confirmed DC plan is home. Stated spouse will assist as needed and will provide transportation at DC. Denies any needs/equipment.               Discharge Codes    No documentation.               Expected Discharge Date and Time     Expected Discharge Date Expected Discharge Time    Nov 11, 2021             Rozina Burch RN

## 2021-11-11 NOTE — PLAN OF CARE
Goal Outcome Evaluation:  Plan of Care Reviewed With: patient        Progress: improving  Outcome Summary: VSS, chest incision dressed w/ dermabond, tegaderm, kerlex, and ace wrap, white ointment along incisional line, 4 JEFE's w/ serosanguinous drainage, voiding freely, on RA, minimal c/o pain.

## 2021-11-11 NOTE — PROGRESS NOTES
SUBJECTIVE:   Did well overnight.  Has walked and voided.  Pain has been well controlled.  No nausea.    OBJECTIVE:  Vitals:    11/11/21 0537   BP: 105/67   Pulse: 71   Resp: 18   Temp: 97.9 °F (36.6 °C)   SpO2: 96%     Physical Exam  Resting in bed, NAD  rrr  No dyspnea  bilatearl breast incisions c/d/i, no fluid collection, left superior flap with some mild ecchymosis but appropriate cap refill    PLAN:  POD1 left ssm and slnb, right risk reducing mastectomy, immediate expander recon  - doing well, ok for dc today  - leave dressing in place  - drain care teaching  - minimal arm movement  - f/u with me monday    Dc Dx: left breast dcis  Dc Condition: good

## 2021-11-11 NOTE — OUTREACH NOTE
Prep Survey      Responses   [facility] patient discharged from? [facility]   Is LACE score < 7 ? Yes   Emergency Room discharge w/ pulse ox? No   Eligibility [facility]   Date of Admission 11/10/21   Date of Discharge 11/11/21   Discharge Disposition Home or Self Care   Discharge diagnosis DCIS left breast,  bilat mastectomies   Does the patient have one of the following disease processes/diagnoses(primary or secondary)? General Surgery   Does the patient have Home health ordered? No   Is there a DME ordered? No   Prep survey completed? Yes          Ninfa Lopez RN

## 2021-11-12 ENCOUNTER — TRANSITIONAL CARE MANAGEMENT TELEPHONE ENCOUNTER (OUTPATIENT)
Dept: CALL CENTER | Facility: HOSPITAL | Age: 61
End: 2021-11-12

## 2021-11-12 PROBLEM — C50.919 BREAST CANCER (HCC): Status: ACTIVE | Noted: 2021-01-01

## 2021-11-12 NOTE — OUTREACH NOTE
Call Center TCM Note      Responses   [facility] patient discharged from? [facility]   Does the patient have one of the following disease processes/diagnoses(primary or secondary)? General Surgery   TCM attempt successful? Yes  [Tyler-spouse ]   Call start time 0916   Call end time 0919   Discharge diagnosis DCIS left breast,  bilat mastectomies   Meds reviewed with patient/caregiver? Yes   Is the patient having any side effects they believe may be caused by any medication additions or changes? No   Does the patient have all medications related to this admission filled (includes all antibiotics, pain medications, etc.) Yes   Is the patient taking all medications as directed (includes completed medication regime)? Yes   Does the patient have a follow up appointment scheduled with their surgeon? Yes  [11/15/21]   Has the patient kept scheduled appointments due by today? N/A   Comments Pt does not want to f/u with PCP at this time as she has too many other appts.   Psychosocial issues? No   Did the patient receive a copy of their discharge instructions? Yes   Nursing interventions Reviewed instructions with patient   What is the patient's perception of their health status since discharge? Improving   Nursing interventions Nurse provided patient education   Is the patient /caregiver able to teach back basic post-op care? Lifting as instructed by MD in discharge instructions   Is the patient/caregiver able to teach back signs and symptoms of incisional infection? Fever,  Increased drainage or bleeding   Is the patient/caregiver able to teach back steps to recovery at home? Rest and rebuild strength, gradually increase activity,  Set small, achievable goals for return to baseline health   If the patient is a current smoker, are they able to teach back resources for cessation? Not a smoker   Is the patient/caregiver able to teach back the hierarchy of who to call/visit for symptoms/problems? PCP, Specialist, Home  health nurse, Urgent Care, ED, 911 Yes   TCM call completed? Yes          Nichol Roy RN    11/12/2021, 09:20 EST

## 2021-12-01 ENCOUNTER — TRANSCRIBE ORDERS (OUTPATIENT)
Dept: PHYSICAL THERAPY | Facility: HOSPITAL | Age: 61
End: 2021-12-01

## 2021-12-01 DIAGNOSIS — C50.919 MALIGNANT NEOPLASM OF FEMALE BREAST, UNSPECIFIED ESTROGEN RECEPTOR STATUS, UNSPECIFIED LATERALITY, UNSPECIFIED SITE OF BREAST (HCC): Primary | ICD-10-CM

## 2021-12-09 ENCOUNTER — TELEPHONE (OUTPATIENT)
Dept: SURGERY | Facility: CLINIC | Age: 61
End: 2021-12-09

## 2021-12-09 NOTE — TELEPHONE ENCOUNTER
Pt is ready to pursue talking with an Oncologist as suggested in hospital per pt.    She lives near [place] so would like to see the [facility] out at [place].    If ok, could you place her a referral to initiate the appt?    Thank you.

## 2021-12-10 DIAGNOSIS — D05.12 DUCTAL CARCINOMA IN SITU (DCIS) OF LEFT BREAST WITH COMEDONECROSIS: Primary | ICD-10-CM

## 2021-12-15 ENCOUNTER — HOSPITAL ENCOUNTER (OUTPATIENT)
Dept: PHYSICAL THERAPY | Facility: HOSPITAL | Age: 61
Setting detail: THERAPIES SERIES
Discharge: HOME | End: 2021-12-15

## 2021-12-15 DIAGNOSIS — Z91.89 AT RISK FOR LYMPHEDEMA: Primary | ICD-10-CM

## 2021-12-15 DIAGNOSIS — D05.12 DUCTAL CARCINOMA IN SITU (DCIS) OF LEFT BREAST: ICD-10-CM

## 2021-12-15 DIAGNOSIS — Z90.13 S/P BILATERAL MASTECTOMY: ICD-10-CM

## 2021-12-15 PROCEDURE — 97161 PT EVAL LOW COMPLEX 20 MIN: CPT

## 2021-12-15 PROCEDURE — 97535 SELF CARE MNGMENT TRAINING: CPT

## 2021-12-15 NOTE — THERAPY EVALUATION
Outpatient Physical Therapy Ortho Initial Evaluation  [facility]     Patient Name: Jessica Wood  : 1960  MRN: [id number]  Today's Date: 12/15/2021      Visit Date: 12/15/2021    Patient Active Problem List   Diagnosis   • Anxiety   • Acquired hypothyroidism   • Breast cancer (HCC)        Past Medical History:   Diagnosis Date   • Anxiety    • Asthma due to seasonal allergies    • Breast cancer (HCC)     LEFT BREAST    • Hypothyroidism    • IBS (irritable bowel syndrome)         Past Surgical History:   Procedure Laterality Date   • BREAST BIOPSY Left    • BREAST RECONSTRUCTION Bilateral 11/10/2021    Procedure: BILATERAL PLACEMENT OF TISSUE EXPANDERS AND ALLODERM;  Surgeon: Last Yoder MD;  Location: [facility];  Service: Plastics;  Laterality: Bilateral;   •  SECTION N/A    • MASTECTOMY W/ SENTINEL NODE BIOPSY Bilateral 11/10/2021    Procedure: bilateral MASTECTOMY WITH SENTINEL NODE BIOPSY;  Surgeon: Dede Dillard MD;  Location: [facility];  Service: General;  Laterality: Bilateral;   • TOTAL THYROIDECTOMY         Visit Dx:     ICD-10-CM ICD-9-CM   1. At risk for lymphedema  Z91.89 V49.89   2. S/P bilateral mastectomy  Z90.13 V45.71   3. Ductal carcinoma in situ (DCIS) of left breast  D05.12 233.0          Patient History     Row Name 12/15/21 1200             History    Chief Complaint --  none  -LB      Date Current Problem(s) Began 11/10/21  -LB      Brief Description of Current Complaint Pt s/p B mastectomy with L SLNB 11/10/21 with B expander placement by Santosh Dillard and Paresh. She had 3 LNs removed which were all negative. She is L handed. She does not work. She lives with her  and has children and grandchildren in town. She does not require chemo or radiation. She is awaiting genetic testing due to mother and sister with hx of breast cancer. She is doing well post-operatively and is in the process of expanding. Exchange surgery scheduled  for 2/14/21. She would like to return to gym exercise once she has exchange surgery.  -LB      Previous treatment for THIS PROBLEM Surgery  -LB      Surgery Date: 11/10/21  -LB      Patient/Caregiver Goals Return to prior level of function; Know what to do to help the symptoms  -LB      Hand Dominance left-handed  -LB      Occupation/sports/leisure activities working out at gym prior to COVID, caring for grandkids  -LB      Patient seeing anyone else for problem(s)? yes  -LB      How has patient tried to help current problem? careful movements  -LB      History of Previous Related Injuries none  -LB              Pain     Pain Location Breast  -LB      Pain at Present 4  -LB      Pain at Best 0  -LB      Pain at Worst 4  -LB      Pain Frequency Intermittent  -LB      Pain Description Tightness  -LB      What Performance Factors Make the Current Problem(s) WORSE? expanding  -LB      What Performance Factors Make the Current Problem(s) BETTER? rest  -LB      Pain Comments I am just tight, not too painful. I can now lay flat.  -LB      Is your sleep disturbed? No  -LB              Fall Risk Assessment    Any falls in the past year: No  -LB              Services    Prior Rehab/Home Health Experiences No  -LB      Are you currently receiving Home Health services No  -LB      Do you plan to receive Home Health services in the near future No  -LB              Daily Activities    Primary Language English  -LB      Pt Participated in POC and Goals Yes  -LB              Safety    Are you being hurt, hit, or frightened by anyone at home or in your life? No  -LB      Are you being neglected by a caregiver No  -LB            User Key  (r) = Recorded By, (t) = Taken By, (c) = Cosigned By    Initials Name Provider Type    Lisandra Chaudhry, PT Physical Therapist                         Lymphedema     Row Name 12/15/21 1200             Subjective Pain    Able to rate subjective pain? yes  -LB      Pre-Treatment Pain Level 0  -LB               Subjective Comments    Subjective Comments I am doing well. Just painful with expanding.  -LB              Lymphedema Assessment    Lymphedema Classification LUE:; at risk/stage 0  -LB      Lymphedema Cancer Related Sx left; sentinel node biopsy; bilateral; radical mastectomy  -LB      Lymph Nodes Removed # 3  -LB      Positive Lymph Nodes # 0  -LB      Chemo Received no  -LB      Radiation Therapy Received no  -LB      Infections or Cellulitis? no  -LB              Posture/Observations    Posture/Observations Comments forward head, rounded shoulders  -LB              General ROM    GENERAL ROM COMMENTS BUE WFL  -LB              Lymphedema Edema Assessment    Edema Assessment Comment no obvious edema  -LB              Skin Changes/Observations    Skin Observations Comment well healing incision  -LB              Lymphedema Sensation    Lymphedema Sensation Comments normal  -LB              Lymphedema Pulses/Capillary Refill    Lymph Pulses Capillary Refill Comments normal  -LB              LUE Quick Girth (cm)    Mid upper arm 29 cm  -LB      Mid forearm 22.2 cm  -LB      Wrist crease 16 cm  -LB      LUE Quick Girth Total 67.2  -LB              Manual Lymphatic Drainage    Manual Therapy discussed self massage  -LB              Compression/Skin Care    Compression/Skin Care Comments measured for compression garment  Ursula Lite Small  -LB              L-Dex Bioimpedence Screening    L-Dex Measurement Extremity --  held due to non-coverage  -LB            User Key  (r) = Recorded By, (t) = Taken By, (c) = Cosigned By    Initials Name Provider Type    Lisandra Chaudhry, PT Physical Therapist                    Therapy Education  Education Details: lengthy discussion concerning s/s of lymphedema, precautions, prevention, compression garment and wear schedule, bioimpedance purpose/value, return to exercise, issued [employer] initial ther ex program and talked about posture and return to gym  Given: HEP, Mobility  training, Symptoms/condition management, Posture/body mechanics  Program: New  How Provided: Verbal, Demonstration, Written  Provided to: Patient  Level of Understanding: Teach back education performed, Verbalized, Demonstrated  04488 - PT Self Care/Mgmt Minutes: 30      PT OP Goals     Row Name 12/15/21 1300          Long Term Goals    LTG Date to Achieve 01/14/22  -LB     LTG 1 Pt will demonstrate understanding and compliance with initial HEP.  -LB     LTG 1 Progress New  -LB     LTG 2 Pt will acquire appropriately fitted compression garment and verbalize appropriate wear schedule.  -LB     LTG 2 Progress New  -LB     LTG 3 Pt will demonstrate understanding of advanced HEP and feel prepared for return to gym activity.  -LB     LTG 3 Progress New  -LB     LTG 4 Pt will verbalize s/s and precautions concerning lymphedema to allow continued independent management.  -LB     LTG 4 Progress New  -LB            Time Calculation    PT Goal Re-Cert Due Date 03/15/22  -LB           User Key  (r) = Recorded By, (t) = Taken By, (c) = Cosigned By    Initials Name Provider Type    Lisandra Chaudhry, PT Physical Therapist                 PT Assessment/Plan     Row Name 12/15/21 1403          PT Assessment    Functional Limitations Limitations in functional capacity and performance; Performance in self-care ADL; Limitations in community activities  -LB     Impairments Impaired flexibility; Impaired lymphatic circulation; Muscle strength; Pain; Posture; Range of motion; Sensation  -LB     Assessment Comments Jessica Wood is a 61 y.o. female recently diagnosed with Left Breast Cancer, presents to therapy in evolving condition, s/p Bilateral Modified Radical Mastectomy with McNeal Node Biopsy with immediate reconstruction with Prepectoral Tissue expander performed on 11/10/21 by surgeons Dr. Dillard and Paresh. Jessica Wood is at increased risk of post Mastectomy lymphedema syndrome Left Upper Extremity due to Breast surgery Lymph  Node Removal . She presents with post-operative decreased range of motion, flexibility, strength, function and increased pain. Currently, negative s/s of lymphedema, infection, seroma, or axillary cording. At this time, functional limitations can be affected by but not limited to pain with end range of motion, dec strength. Jessica Wood will benefit from skilled formal Breast Care Physical Therapy following implant exchange to prepare her to return to gym workout program. Please refer to Plan.  -LB     Please refer to paper survey for additional self-reported information Yes  -LB     Rehab Potential Good  -LB     Patient/caregiver participated in establishment of treatment plan and goals Yes  -LB     Patient would benefit from skilled therapy intervention Yes  -LB            PT Plan    PT Frequency 1x/week  -LB     Predicted Duration of Therapy Intervention (PT) 4-6 visits  -LB     Planned CPT's? PT EVAL LOW COMPLEXITY: 30032; PT RE-EVAL: 41281; PT THER PROC EA 15 MIN: 20964; PT THER ACT EA 15 MIN: 45020; PT MANUAL THERAPY EA 15 MIN: 91255; PT NEUROMUSC RE-EDUCATION EA 15 MIN: 63874; PT SELF CARE/HOME MGMT/TRAIN EA 15: 62757; PT BIS XTRACELL FLUID ANALYSIS: 62715  -LB     PT Plan Comments prepare for transition from implant exchange to gym program, assess soft tissue if limited, discussed mobilizing implants, did pt get sleeve?  -LB           User Key  (r) = Recorded By, (t) = Taken By, (c) = Cosigned By    Initials Name Provider Type    LB Lisandra Fajardo, PT Physical Therapist                   OP Exercises     Row Name 12/15/21 1200             Subjective Comments    Subjective Comments I am doing well. Just painful with expanding.  -LB              Subjective Pain    Able to rate subjective pain? yes  -LB      Pre-Treatment Pain Level 0  -LB              Exercise 1    Exercise Name 1 issued [email or web address] initial exercise program  -LB            User Key  (r) = Recorded By, (t) = Taken By, (c) = Cosigned By    Initials  Name Provider Type    LB Lisandra Fajardo, PT Physical Therapist                              Outcome Measure Options: Quick DASH  Quick DASH  Open a tight or new jar.: No Difficulty  Do heavy household chores (e.g., wash walls, wash floors): No Difficulty  Carry a shopping bag or briefcase: No Difficulty  Wash your back: No Difficulty  Use a knife to cut food: No Difficulty  Recreational activities in which you take some force or impact through your arm, should or hand (e.g. golf, hammering, tennis, etc.): Moderate Difficulty  During the past week, to what extent has your arm, shoulder, or hand problem interfered with your normal social activites with family, friends, neighbors or groups?: Slightly  During the past week, were you limited in your work or other regular daily activities as a result of your arm, shoulder or hand problem?: Not limited at all  Arm, Shoulder, or hand pain: None  Tingling (pins and needles) in your arm, shoulder, or hand: None  During the past week, how much difficulty have you had sleeping because of the pain in your arm, shoulder or hand?: No difficulty  Number of Questions Answered: 11  Quick DASH Score: 6.82  Quick Dash Comments: 7% disability         Time Calculation:     Start Time: 1200  Stop Time: 1245  Time Calculation (min): 45 min  Total Timed Code Minutes- PT: 30 minute(s)  Timed Charges  05469 - PT Self Care/Mgmt Minutes: 30  Total Minutes  Timed Charges Total Minutes: 30   Total Minutes: 30     Therapy Charges for Today     Code Description Service Date Service Provider Modifiers Qty    94558821174 HC PT SELF CARE/MGMT/TRAIN EA 15 MIN 12/15/2021 Lisandra Fajardo, PT GP 2    [id number] HC PT EVAL LOW COMPLEXITY 1 12/15/2021 Lisandra Fajardo, PT GP 1          PT G-Codes  Outcome Measure Options: Quick DASH  Quick DASH Score: 6.82         Lisandra Fajardo PT  12/15/2021

## 2021-12-30 ENCOUNTER — CONSULT (OUTPATIENT)
Dept: ONCOLOGY | Facility: CLINIC | Age: 61
End: 2021-12-30

## 2021-12-30 ENCOUNTER — LAB (OUTPATIENT)
Dept: OTHER | Facility: HOSPITAL | Age: 61
End: 2021-12-30

## 2021-12-30 VITALS
DIASTOLIC BLOOD PRESSURE: 81 MMHG | OXYGEN SATURATION: 99 % | RESPIRATION RATE: 20 BRPM | BODY MASS INDEX: 26.07 KG/M2 | TEMPERATURE: 96.9 F | SYSTOLIC BLOOD PRESSURE: 127 MMHG | HEIGHT: 68 IN | HEART RATE: 100 BPM | WEIGHT: 172 LBS

## 2021-12-30 DIAGNOSIS — D05.12 DUCTAL CARCINOMA IN SITU (DCIS) OF LEFT BREAST: ICD-10-CM

## 2021-12-30 DIAGNOSIS — D05.12 DUCTAL CARCINOMA IN SITU (DCIS) OF LEFT BREAST: Primary | ICD-10-CM

## 2021-12-30 LAB
ALBUMIN SERPL-MCNC: 4.4 G/DL (ref 3.5–5.2)
ALBUMIN/GLOB SERPL: 1.7 G/DL
ALP SERPL-CCNC: 97 U/L (ref 39–117)
ALT SERPL W P-5'-P-CCNC: 48 U/L (ref 1–33)
ANION GAP SERPL CALCULATED.3IONS-SCNC: 10.5 MMOL/L (ref 5–15)
AST SERPL-CCNC: 40 U/L (ref 1–32)
BASOPHILS # BLD AUTO: 0.03 10*3/MM3 (ref 0–0.2)
BASOPHILS NFR BLD AUTO: 0.7 % (ref 0–1.5)
BILIRUB SERPL-MCNC: 0.4 MG/DL (ref 0–1.2)
BUN SERPL-MCNC: 13 MG/DL (ref 8–23)
BUN/CREAT SERPL: 15.9 (ref 7–25)
CALCIUM SPEC-SCNC: 9.3 MG/DL (ref 8.6–10.5)
CHLORIDE SERPL-SCNC: 104 MMOL/L (ref 98–107)
CO2 SERPL-SCNC: 26.5 MMOL/L (ref 22–29)
CREAT SERPL-MCNC: 0.82 MG/DL (ref 0.57–1)
DEPRECATED RDW RBC AUTO: 40.4 FL (ref 37–54)
EOSINOPHIL # BLD AUTO: 0.23 10*3/MM3 (ref 0–0.4)
EOSINOPHIL NFR BLD AUTO: 5.1 % (ref 0.3–6.2)
ERYTHROCYTE [DISTWIDTH] IN BLOOD BY AUTOMATED COUNT: 11.8 % (ref 12.3–15.4)
GFR SERPL CREATININE-BSD FRML MDRD: 71 ML/MIN/1.73
GLOBULIN UR ELPH-MCNC: 2.6 GM/DL
GLUCOSE SERPL-MCNC: 108 MG/DL (ref 65–99)
HCT VFR BLD AUTO: 40.3 % (ref 34–46.6)
HGB BLD-MCNC: 13.4 G/DL (ref 12–15.9)
IMM GRANULOCYTES # BLD AUTO: 0.01 10*3/MM3 (ref 0–0.05)
IMM GRANULOCYTES NFR BLD AUTO: 0.2 % (ref 0–0.5)
LYMPHOCYTES # BLD AUTO: 2.24 10*3/MM3 (ref 0.7–3.1)
LYMPHOCYTES NFR BLD AUTO: 49.8 % (ref 19.6–45.3)
MCH RBC QN AUTO: 30.9 PG (ref 26.6–33)
MCHC RBC AUTO-ENTMCNC: 33.3 G/DL (ref 31.5–35.7)
MCV RBC AUTO: 93.1 FL (ref 79–97)
MONOCYTES # BLD AUTO: 0.37 10*3/MM3 (ref 0.1–0.9)
MONOCYTES NFR BLD AUTO: 8.2 % (ref 5–12)
NEUTROPHILS NFR BLD AUTO: 1.62 10*3/MM3 (ref 1.7–7)
NEUTROPHILS NFR BLD AUTO: 36 % (ref 42.7–76)
NRBC BLD AUTO-RTO: 0 /100 WBC (ref 0–0.2)
PLATELET # BLD AUTO: 239 10*3/MM3 (ref 140–450)
PMV BLD AUTO: 10.2 FL (ref 6–12)
POTASSIUM SERPL-SCNC: 4.5 MMOL/L (ref 3.5–5.2)
PROT SERPL-MCNC: 7 G/DL (ref 6–8.5)
RBC # BLD AUTO: 4.33 10*6/MM3 (ref 3.77–5.28)
SODIUM SERPL-SCNC: 141 MMOL/L (ref 136–145)
WBC NRBC COR # BLD: 4.5 10*3/MM3 (ref 3.4–10.8)

## 2021-12-30 PROCEDURE — 80053 COMPREHEN METABOLIC PANEL: CPT | Performed by: INTERNAL MEDICINE

## 2021-12-30 PROCEDURE — 85025 COMPLETE CBC W/AUTO DIFF WBC: CPT | Performed by: INTERNAL MEDICINE

## 2021-12-30 PROCEDURE — 99204 OFFICE O/P NEW MOD 45 MIN: CPT | Performed by: INTERNAL MEDICINE

## 2021-12-30 PROCEDURE — 36415 COLL VENOUS BLD VENIPUNCTURE: CPT

## 2021-12-30 NOTE — PROGRESS NOTES
Subjective   Jessica Wood is a 61 y.o. female.  Referred by Dr. Dillard for ductal carcinoma in situ    History of Present Illness   Ms. Ramos is a 61-year-old postmenopausal  lady with a strong family history of breast cancer with her sister being diagnosed with breast cancer in her 30s and bilateral breast cancer, her mother with history of bilateral breast cancer.  Patient has been undergoing screening mammograms and MRIs every 6 monthly.  Her most recent mammogram in February 2021 this year was normal.  8/24/2021 MRI of the breast showed an area of non-mass enhancement measuring 1.3 x 0.4 x 1.5 cm in the left breast.  A biopsy of this was recommended.  Biopsy this area was consistent with ductal carcinoma in situ, high-grade,ER +4%, AR weakly positive, Ki-67 30%.  Due to strong family history patient opted to proceed with bilateral mastectomy and reconstruction.  She is status post bilateral mastectomy.  11/10/2021 pathology shows ductal carcinoma in situ measuring 10 x 1 x 1 mm in the left breast, ER positive for percent, AR +2% weak, Ki-67 30%.  Right breast benign.  3 sentinel lymph nodes on the left negative.  She is recovering well from surgery and undergoing reconstruction.  She is here to discuss adjuvant therapy.  Prior to the diagnosis of DCIS she was on Evista for risk reduction.  She had a normal bone density scan in 2016.  She has a 34-year-old daughter and she is concerned about the risk of breast cancer in her daughter.  She has 1 son.  No family history of ovarian, pancreatic, prostate cancer or melanomas.  Maternal grandmother with lung cancer but she was a smoker.    The following portions of the patient's history were reviewed and updated as appropriate: allergies, current medications, past family history, past medical history, past social history, past surgical history and problem list.    Past Medical History:   Diagnosis Date   • Anxiety    • Asthma due to seasonal allergies    •  Breast cancer (HCC)     LEFT BREAST    • Hypothyroidism    • IBS (irritable bowel syndrome)         Past Surgical History:   Procedure Laterality Date   • BREAST BIOPSY Left    • BREAST RECONSTRUCTION Bilateral 11/10/2021    Procedure: BILATERAL PLACEMENT OF TISSUE EXPANDERS AND ALLODERM;  Surgeon: Last Yoder MD;  Location: [facility];  Service: Plastics;  Laterality: Bilateral;   •  SECTION N/A    • MASTECTOMY W/ SENTINEL NODE BIOPSY Bilateral 11/10/2021    Procedure: bilateral MASTECTOMY WITH SENTINEL NODE BIOPSY;  Surgeon: Dede Dillard MD;  Location: [facility];  Service: General;  Laterality: Bilateral;   • TOTAL THYROIDECTOMY          Family History   Problem Relation Age of Onset   • Breast cancer Mother    • Breast cancer Sister    • Miscarriages / Stillbirths Sister    • Malig Hyperthermia Neg Hx         Social History     Socioeconomic History   • Marital status:    Tobacco Use   • Smoking status: Never Smoker   • Smokeless tobacco: Never Used   Vaping Use   • Vaping Use: Never used   Substance and Sexual Activity   • Alcohol use: Yes     Alcohol/week: 0.0 standard drinks     Comment: SOCIAL    • Drug use: Never   • Sexual activity: Yes     Partners: Male     Birth control/protection: Post-menopausal        OB History        2    Para   2    Term   2            AB        Living           SAB        IAB        Ectopic        Molar        Multiple        Live Births                 Age of menarche-14  Age at first live childbirth-26   2 para 2  0  Age at menopause-54  No use of hormone replacement therapy oral contraceptive pills  She had infertility issues for which she had to use Clomid.    Allergies   Allergen Reactions   • Sulfa Antibiotics Hives     Hives, itching, swelling   • Latex Hives     Hives and itching            Review of Systems   Constitutional: Negative.    HENT: Negative.    Eyes: Negative.    Respiratory:  "Negative.    Cardiovascular: Negative.    Gastrointestinal: Negative.    Genitourinary: Positive for amenorrhea.   Musculoskeletal: Negative.    Allergic/Immunologic: Negative.    Neurological: Negative.    Hematological: Negative.    Psychiatric/Behavioral: Negative.          Objective   Blood pressure 127/81, pulse 100, temperature 96.9 °F (36.1 °C), temperature source Temporal, resp. rate 20, height 172 cm (67.72\"), weight 78 kg (172 lb), SpO2 99 %.   Physical Exam  Vitals reviewed.   Constitutional:       Appearance: Normal appearance.   HENT:      Head: Normocephalic and atraumatic.      Right Ear: External ear normal.      Left Ear: External ear normal.      Nose: Nose normal.      Mouth/Throat:      Mouth: Mucous membranes are moist.   Eyes:      Conjunctiva/sclera: Conjunctivae normal.      Pupils: Pupils are equal, round, and reactive to light.   Cardiovascular:      Rate and Rhythm: Normal rate and regular rhythm.   Pulmonary:      Effort: Pulmonary effort is normal.   Abdominal:      General: Abdomen is flat.   Musculoskeletal:         General: Normal range of motion.      Cervical back: Normal range of motion.   Skin:     General: Skin is warm.   Neurological:      General: No focal deficit present.      Mental Status: She is alert and oriented to person, place, and time.   Psychiatric:         Mood and Affect: Mood normal.         Behavior: Behavior normal.         Thought Content: Thought content normal.         Judgment: Judgment normal.       Breast Exam: Status post bilateral mastectomy with reconstruction    Lab on 12/30/2021   Component Date Value Ref Range Status   • Glucose 12/30/2021 108* 65 - 99 mg/dL Final   • BUN 12/30/2021 13  8 - 23 mg/dL Final   • Creatinine 12/30/2021 0.82  0.57 - 1.00 mg/dL Final   • Sodium 12/30/2021 141  136 - 145 mmol/L Final   • Potassium 12/30/2021 4.5  3.5 - 5.2 mmol/L Final   • Chloride 12/30/2021 104  98 - 107 mmol/L Final   • CO2 12/30/2021 26.5  22.0 - 29.0 " mmol/L Final   • Calcium 12/30/2021 9.3  8.6 - 10.5 mg/dL Final   • Total Protein 12/30/2021 7.0  6.0 - 8.5 g/dL Final   • Albumin 12/30/2021 4.40  3.50 - 5.20 g/dL Final   • ALT (SGPT) 12/30/2021 48* 1 - 33 U/L Final   • AST (SGOT) 12/30/2021 40* 1 - 32 U/L Final   • Alkaline Phosphatase 12/30/2021 97  39 - 117 U/L Final   • Total Bilirubin 12/30/2021 0.4  0.0 - 1.2 mg/dL Final   • eGFR Non  Amer 12/30/2021 71  >60 mL/min/1.73 Final   • Globulin 12/30/2021 2.6  gm/dL Final   • A/G Ratio 12/30/2021 1.7  g/dL Final   • BUN/Creatinine Ratio 12/30/2021 15.9  7.0 - 25.0 Final   • Anion Gap 12/30/2021 10.5  5.0 - 15.0 mmol/L Final   • WBC 12/30/2021 4.50  3.40 - 10.80 10*3/mm3 Final   • RBC 12/30/2021 4.33  3.77 - 5.28 10*6/mm3 Final   • Hemoglobin 12/30/2021 13.4  12.0 - 15.9 g/dL Final   • Hematocrit 12/30/2021 40.3  34.0 - 46.6 % Final   • MCV 12/30/2021 93.1  79.0 - 97.0 fL Final   • MCH 12/30/2021 30.9  26.6 - 33.0 pg Final   • MCHC 12/30/2021 33.3  31.5 - 35.7 g/dL Final   • RDW 12/30/2021 11.8* 12.3 - 15.4 % Final   • RDW-SD 12/30/2021 40.4  37.0 - 54.0 fl Final   • MPV 12/30/2021 10.2  6.0 - 12.0 fL Final   • Platelets 12/30/2021 239  140 - 450 10*3/mm3 Final   • Neutrophil % 12/30/2021 36.0* 42.7 - 76.0 % Final   • Lymphocyte % 12/30/2021 49.8* 19.6 - 45.3 % Final   • Monocyte % 12/30/2021 8.2  5.0 - 12.0 % Final   • Eosinophil % 12/30/2021 5.1  0.3 - 6.2 % Final   • Basophil % 12/30/2021 0.7  0.0 - 1.5 % Final   • Immature Grans % 12/30/2021 0.2  0.0 - 0.5 % Final   • Neutrophils, Absolute 12/30/2021 1.62* 1.70 - 7.00 10*3/mm3 Final   • Lymphocytes, Absolute 12/30/2021 2.24  0.70 - 3.10 10*3/mm3 Final   • Monocytes, Absolute 12/30/2021 0.37  0.10 - 0.90 10*3/mm3 Final   • Eosinophils, Absolute 12/30/2021 0.23  0.00 - 0.40 10*3/mm3 Final   • Basophils, Absolute 12/30/2021 0.03  0.00 - 0.20 10*3/mm3 Final   • Immature Grans, Absolute 12/30/2021 0.01  0.00 - 0.05 10*3/mm3 Final   • nRBC 12/30/2021 0.0  0.0  - 0.2 /100 WBC Final        No radiology results for the last 30 days.     CBC and CMP reviewed and relatively normal    Assessment/Plan       *Left breast DCIS  · High-grade, weakly ER/WI positive/almost, receptor negative  · She is status post bilateral mastectomy  · Explained to her regarding the pathology and the fact that with DCIS there is risk of local recurrence or new invasive breast cancer ipsilaterally and contralaterally but now that she has had a bilateral mastectomy this would not be risk anymore.  · Therefore she does not need any adjuvant endocrine therapy.    *Family history of breast cancer  · Her sister underwent genetic testing and it was negative  · Patient has been referred for genetic testing however she does not want to perform 47 gene panel.  · She only wants to know about the breast cancer genes which it would probably be enough to do the 9 gene panel.  · She is not ready to do genetic testing at this time  · She knows to call the clinic when she is ready to undergo genetic testing  · At that point we will order the 9 gene panel.    *Follow-up-1 year

## 2022-01-10 ENCOUNTER — DOCUMENTATION (OUTPATIENT)
Dept: OTHER | Facility: HOSPITAL | Age: 62
End: 2022-01-10

## 2022-01-10 NOTE — PROGRESS NOTES
Oncology Social Work    Distress Screening Score: 5 ( emotional concerns noted)    Chart reviewed.  Patient diagnosed with stage 0 DCIS left breast cancer. Patient is s/p bilateral mastectomy.  No chemo planned.  No adjuvant endocrine therapy.  No Radiation therapy.   OSW mailed patient explanation of services letter along with community support programs through [employer] and [employer] [facility] [employer].     Jaja Liz, AIDEEW, CSW

## 2022-02-11 ENCOUNTER — PRE-ADMISSION TESTING (OUTPATIENT)
Dept: PREADMISSION TESTING | Facility: HOSPITAL | Age: 62
End: 2022-02-11

## 2022-02-11 VITALS
WEIGHT: 167 LBS | SYSTOLIC BLOOD PRESSURE: 149 MMHG | RESPIRATION RATE: 16 BRPM | TEMPERATURE: 97.9 F | HEIGHT: 68 IN | HEART RATE: 72 BPM | BODY MASS INDEX: 25.31 KG/M2 | OXYGEN SATURATION: 96 % | DIASTOLIC BLOOD PRESSURE: 84 MMHG

## 2022-02-11 LAB
ANION GAP SERPL CALCULATED.3IONS-SCNC: 9 MMOL/L (ref 5–15)
BUN SERPL-MCNC: 11 MG/DL (ref 8–23)
BUN/CREAT SERPL: 14.7 (ref 7–25)
CALCIUM SPEC-SCNC: 9.7 MG/DL (ref 8.6–10.5)
CHLORIDE SERPL-SCNC: 104 MMOL/L (ref 98–107)
CO2 SERPL-SCNC: 28 MMOL/L (ref 22–29)
CREAT SERPL-MCNC: 0.75 MG/DL (ref 0.57–1)
DEPRECATED RDW RBC AUTO: 40.9 FL (ref 37–54)
ERYTHROCYTE [DISTWIDTH] IN BLOOD BY AUTOMATED COUNT: 11.8 % (ref 12.3–15.4)
GFR SERPL CREATININE-BSD FRML MDRD: 79 ML/MIN/1.73
GLUCOSE SERPL-MCNC: 96 MG/DL (ref 65–99)
HCT VFR BLD AUTO: 43.7 % (ref 34–46.6)
HGB BLD-MCNC: 14.5 G/DL (ref 12–15.9)
MCH RBC QN AUTO: 31.1 PG (ref 26.6–33)
MCHC RBC AUTO-ENTMCNC: 33.2 G/DL (ref 31.5–35.7)
MCV RBC AUTO: 93.8 FL (ref 79–97)
PLATELET # BLD AUTO: 228 10*3/MM3 (ref 140–450)
PMV BLD AUTO: 10.9 FL (ref 6–12)
POTASSIUM SERPL-SCNC: 4.2 MMOL/L (ref 3.5–5.2)
RBC # BLD AUTO: 4.66 10*6/MM3 (ref 3.77–5.28)
SARS-COV-2 ORF1AB RESP QL NAA+PROBE: NOT DETECTED
SODIUM SERPL-SCNC: 141 MMOL/L (ref 136–145)
WBC NRBC COR # BLD: 4.5 10*3/MM3 (ref 3.4–10.8)

## 2022-02-11 PROCEDURE — C9803 HOPD COVID-19 SPEC COLLECT: HCPCS

## 2022-02-11 PROCEDURE — 80048 BASIC METABOLIC PNL TOTAL CA: CPT

## 2022-02-11 PROCEDURE — 85027 COMPLETE CBC AUTOMATED: CPT

## 2022-02-11 PROCEDURE — U0004 COV-19 TEST NON-CDC HGH THRU: HCPCS

## 2022-02-11 PROCEDURE — 36415 COLL VENOUS BLD VENIPUNCTURE: CPT

## 2022-02-11 NOTE — DISCHARGE INSTRUCTIONS
Take the following medications the morning of surgery: LEXAPRO      If you are on prescription narcotic pain medication to control your pain you may also take that medication the morning of surgery.    General Instructions:  • Do not eat solid food after midnight the night before surgery.  • You may drink clear liquids day of surgery but must stop at least one hour before your hospital arrival time.  • It is beneficial for you to have a clear drink that contains carbohydrates the day of surgery.  We suggest a 12 to 20 ounce bottle of Gatorade or Powerade for non-diabetic patients or a 12 to 20 ounce bottle of G2 or Powerade Zero for diabetic patients.     Clear liquids are liquids you can see through.  Nothing red in color.     Plain water                               Sports drinks  Sodas                                   Gelatin (Jell-O)  Fruit juices without pulp such as white grape juice and apple juice  Popsicles that contain no fruit or yogurt  Tea or coffee (no cream or milk added)  Gatorade / Powerade  G2 / Powerade Zero    • Bring any papers given to you in the doctor’s office.  • Wear clean comfortable clothes.  • Do not wear contact lenses, false eyelashes or make-up.  Bring a case for your glasses.   • Bring crutches or walker if applicable.  • Remove all piercings.  Leave jewelry and any other valuables at home.  • Hair extensions with metal clips must be removed prior to surgery.  • The Pre-Admission Testing nurse will instruct you to bring medications if unable to obtain an accurate list in Pre-Admission Testing.            Preventing a Surgical Site Infection:  • For 2 to 3 days before surgery, avoid shaving with a razor because the razor can irritate skin and make it easier to develop an infection.    • Any areas of open skin can increase the risk of a post-operative wound infection by allowing bacteria to enter and travel throughout the body.  Notify your surgeon if you have any skin wounds /  rashes even if it is not near the expected surgical site.  The area will need assessed to determine if surgery should be delayed until it is healed.  • The night prior to surgery shower using a fresh bar of anti-bacterial soap (such as Dial) and clean washcloth.  Sleep in a clean bed with clean clothing.  Do not allow pets to sleep with you.  • Shower on the morning of surgery using a fresh bar of anti-bacterial soap (such as Dial) and clean washcloth.  Dry with a clean towel and dress in clean clothing.  • Ask your surgeon if you will be receiving antibiotics prior to surgery.  • Make sure you, your family, and all healthcare providers clean their hands with soap and water or an alcohol based hand  before caring for you or your wound.    Day of surgery:  Your arrival time is approximately two hours before your scheduled surgery time.  Upon arrival, a Pre-op nurse and Anesthesiologist will review your health history, obtain vital signs, and answer questions you may have.  The only belongings needed at this time will be a list of your home medications and if applicable your C-PAP/BI-PAP machine.  A Pre-op nurse will start an IV and you may receive medication in preparation for surgery, including something to help you relax.     Please be aware that surgery does come with discomfort.  We want to make every effort to control your discomfort so please discuss any uncontrolled symptoms with your nurse.   Your doctor will most likely have prescribed pain medications.      If you are going home after surgery you will receive individualized written care instructions before being discharged.  A responsible adult must drive you to and from the hospital on the day of your surgery and stay with you for 24 hours.  Discharge prescriptions can be filled by the hospital pharmacy during regular pharmacy hours.  If you are having surgery late in the day/evening your prescription may be e-prescribed to your pharmacy.  Please  verify your pharmacy hours or chose a 24 hour pharmacy to avoid not having access to your prescription because your pharmacy has closed for the day.    If you are staying overnight following surgery, you will be transported to your hospital room following the recovery period.  [facility] has all private rooms.    If you have any questions please call Pre-Admission Testing at [phone].  Deductibles and co-payments are collected on the day of service. Please be prepared to pay the required co-pay, deductible or deposit on the day of service as defined by your plan.    Patient Education for Self-Quarantine Process    • Following your COVID testing, we strongly recommend that you wear a mask when you are with other people and practice social distancing.   • Limit your activities to only required outings.  • Wash your hands with soap and water frequently for at least 20 seconds.   • Avoid touching your eyes, nose and mouth with unwashed hands.  • Do not share anything - utensils, drinking glasses, food from the same bowl.   • Sanitize household surfaces daily. Include all high touch areas (door handles, light switches, phones, countertops, etc.)    Call your surgeon immediately if you experience any of the following symptoms:  • Sore Throat  • Shortness of Breath or difficulty breathing  • Cough  • Chills  • Body soreness or muscle pain  • Headache  • Fever  • New loss of taste or smell  • Do not arrive for your surgery ill.  Your procedure will need to be rescheduled to another time.  You will need to call your physician before the day of surgery to avoid any unnecessary exposure to hospital staff as well as other patients.    CHLORHEXIDINE CLOTH INSTRUCTIONS  The morning of surgery follow these instructions using the Chlorhexidine cloths you've been given.  These steps reduce bacteria on the body.  Do not use the cloths near your eyes, ears mouth, genitalia or on open wounds.  Throw the cloths away  after use but do not try to flush them down a toilet.      • Open and remove one cloth at a time from the package.    • Leave the cloth unfolded and begin the bathing.  • Massage the skin with the cloths using gentle pressure to remove bacteria.  Do not scrub harshly.   • Follow the steps below with one 2% CHG cloth per area (6 total cloths).  • One cloth for neck, shoulders and chest.  • One cloth for both arms, hands, fingers and underarms (do underarms last).  • One cloth for the abdomen followed by groin.  • One cloth for right leg and foot including between the toes.  • One cloth for left leg and foot including between the toes.  • The last cloth is to be used for the back of the neck, back and buttocks.    Allow the CHG to air dry 3 minutes on the skin which will give it time to work and decrease the chance of irritation.  The skin may feel sticky until it is dry.  Do not rinse with water or any other liquid or you will lose the beneficial effects of the CHG.  If mild skin irritation occurs, do rinse the skin to remove the CHG.  Report this to the nurse at time of admission.  Do not apply lotions, creams, ointments, deodorants or perfumes after using the clothes. Dress in clean clothes before coming to the hospital.

## 2022-02-14 ENCOUNTER — ANESTHESIA EVENT (OUTPATIENT)
Dept: PERIOP | Facility: HOSPITAL | Age: 62
End: 2022-02-14

## 2022-02-14 ENCOUNTER — HOSPITAL ENCOUNTER (OUTPATIENT)
Facility: HOSPITAL | Age: 62
Setting detail: HOSPITAL OUTPATIENT SURGERY
Discharge: HOME | End: 2022-02-14
Attending: PLASTIC SURGERY | Admitting: PLASTIC SURGERY

## 2022-02-14 ENCOUNTER — ANESTHESIA (OUTPATIENT)
Dept: PERIOP | Facility: HOSPITAL | Age: 62
End: 2022-02-14

## 2022-02-14 VITALS
DIASTOLIC BLOOD PRESSURE: 57 MMHG | RESPIRATION RATE: 16 BRPM | TEMPERATURE: 97.6 F | HEIGHT: 68 IN | SYSTOLIC BLOOD PRESSURE: 118 MMHG | HEART RATE: 82 BPM | OXYGEN SATURATION: 96 % | BODY MASS INDEX: 25.76 KG/M2 | WEIGHT: 170 LBS

## 2022-02-14 DIAGNOSIS — R22.30 MASS OF HAND: ICD-10-CM

## 2022-02-14 DIAGNOSIS — C50.912 MALIGNANT NEOPLASM OF LEFT FEMALE BREAST, UNSPECIFIED ESTROGEN RECEPTOR STATUS, UNSPECIFIED SITE OF BREAST: Primary | ICD-10-CM

## 2022-02-14 PROCEDURE — 0 LIDOCAINE 1 % SOLUTION 10 ML VIAL: Performed by: PLASTIC SURGERY

## 2022-02-14 PROCEDURE — 25010000002 FENTANYL CITRATE (PF) 50 MCG/ML SOLUTION: Performed by: NURSE ANESTHETIST, CERTIFIED REGISTERED

## 2022-02-14 PROCEDURE — C1789 PROSTHESIS, BREAST, IMP: HCPCS | Performed by: PLASTIC SURGERY

## 2022-02-14 PROCEDURE — 25010000002 HYDROMORPHONE PER 4 MG: Performed by: NURSE ANESTHETIST, CERTIFIED REGISTERED

## 2022-02-14 PROCEDURE — 25010000002 PROPOFOL 10 MG/ML EMULSION: Performed by: NURSE ANESTHETIST, CERTIFIED REGISTERED

## 2022-02-14 PROCEDURE — 88305 TISSUE EXAM BY PATHOLOGIST: CPT | Performed by: PLASTIC SURGERY

## 2022-02-14 PROCEDURE — 0 CEFAZOLIN PER 500 MG: Performed by: PLASTIC SURGERY

## 2022-02-14 PROCEDURE — 25010000002 GENTAMICIN PER 80 MG: Performed by: PLASTIC SURGERY

## 2022-02-14 PROCEDURE — 25010000002 NEOSTIGMINE 5 MG/10ML SOLUTION: Performed by: NURSE ANESTHETIST, CERTIFIED REGISTERED

## 2022-02-14 PROCEDURE — 25010000002 EPINEPHRINE PER 0.1 MG: Performed by: PLASTIC SURGERY

## 2022-02-14 PROCEDURE — 63710000001 ONDANSETRON PER 8 MG: Performed by: PLASTIC SURGERY

## 2022-02-14 PROCEDURE — 25010000002 DEXAMETHASONE PER 1 MG: Performed by: NURSE ANESTHETIST, CERTIFIED REGISTERED

## 2022-02-14 DEVICE — IMPLANTABLE DEVICE: Type: IMPLANTABLE DEVICE | Site: BREAST | Status: FUNCTIONAL

## 2022-02-14 RX ORDER — HYDROCODONE BITARTRATE AND ACETAMINOPHEN 5; 325 MG/1; MG/1
1-2 TABLET ORAL EVERY 4 HOURS PRN
Qty: 15 TABLET | Refills: 0 | Status: SHIPPED | OUTPATIENT
Start: 2022-02-14

## 2022-02-14 RX ORDER — ACETAMINOPHEN 325 MG/1
325 TABLET ORAL EVERY 4 HOURS PRN
Qty: 30 TABLET | Refills: 1 | Status: SHIPPED | OUTPATIENT
Start: 2022-02-14

## 2022-02-14 RX ORDER — ONDANSETRON HYDROCHLORIDE 8 MG/1
8 TABLET, FILM COATED ORAL ONCE
Status: COMPLETED | OUTPATIENT
Start: 2022-02-14 | End: 2022-02-14

## 2022-02-14 RX ORDER — LABETALOL HYDROCHLORIDE 5 MG/ML
5 INJECTION, SOLUTION INTRAVENOUS
Status: DISCONTINUED | OUTPATIENT
Start: 2022-02-14 | End: 2022-02-14 | Stop reason: HOSPADM

## 2022-02-14 RX ORDER — SODIUM CHLORIDE 0.9 % (FLUSH) 0.9 %
3 SYRINGE (ML) INJECTION EVERY 12 HOURS SCHEDULED
Status: DISCONTINUED | OUTPATIENT
Start: 2022-02-14 | End: 2022-02-14 | Stop reason: HOSPADM

## 2022-02-14 RX ORDER — LIDOCAINE HYDROCHLORIDE AND EPINEPHRINE 5; 5 MG/ML; UG/ML
INJECTION, SOLUTION INFILTRATION; PERINEURAL AS NEEDED
Status: DISCONTINUED | OUTPATIENT
Start: 2022-02-14 | End: 2022-02-14 | Stop reason: HOSPADM

## 2022-02-14 RX ORDER — ACETAMINOPHEN 325 MG/1
650 TABLET ORAL ONCE AS NEEDED
Status: DISCONTINUED | OUTPATIENT
Start: 2022-02-14 | End: 2022-02-14 | Stop reason: HOSPADM

## 2022-02-14 RX ORDER — DIPHENHYDRAMINE HCL 25 MG
25 CAPSULE ORAL
Status: DISCONTINUED | OUTPATIENT
Start: 2022-02-14 | End: 2022-02-14 | Stop reason: HOSPADM

## 2022-02-14 RX ORDER — FENTANYL CITRATE 50 UG/ML
50 INJECTION, SOLUTION INTRAMUSCULAR; INTRAVENOUS
Status: DISCONTINUED | OUTPATIENT
Start: 2022-02-14 | End: 2022-02-14 | Stop reason: HOSPADM

## 2022-02-14 RX ORDER — ESCITALOPRAM OXALATE 20 MG/1
TABLET ORAL
COMMUNITY
Start: 2022-02-08

## 2022-02-14 RX ORDER — FLUMAZENIL 0.1 MG/ML
0.2 INJECTION INTRAVENOUS AS NEEDED
Status: DISCONTINUED | OUTPATIENT
Start: 2022-02-14 | End: 2022-02-14 | Stop reason: HOSPADM

## 2022-02-14 RX ORDER — GABAPENTIN 100 MG/1
100 CAPSULE ORAL EVERY 8 HOURS
Qty: 60 CAPSULE | Refills: 2 | Status: SHIPPED | OUTPATIENT
Start: 2022-02-14

## 2022-02-14 RX ORDER — GLYCOPYRROLATE 0.2 MG/ML
INJECTION INTRAMUSCULAR; INTRAVENOUS AS NEEDED
Status: DISCONTINUED | OUTPATIENT
Start: 2022-02-14 | End: 2022-02-14 | Stop reason: SURG

## 2022-02-14 RX ORDER — SODIUM CHLORIDE, SODIUM LACTATE, POTASSIUM CHLORIDE, CALCIUM CHLORIDE 600; 310; 30; 20 MG/100ML; MG/100ML; MG/100ML; MG/100ML
9 INJECTION, SOLUTION INTRAVENOUS CONTINUOUS
Status: DISCONTINUED | OUTPATIENT
Start: 2022-02-14 | End: 2022-02-14 | Stop reason: HOSPADM

## 2022-02-14 RX ORDER — ACETAMINOPHEN 500 MG
1000 TABLET ORAL ONCE
Status: COMPLETED | OUTPATIENT
Start: 2022-02-14 | End: 2022-02-14

## 2022-02-14 RX ORDER — FENTANYL CITRATE 50 UG/ML
INJECTION, SOLUTION INTRAMUSCULAR; INTRAVENOUS AS NEEDED
Status: DISCONTINUED | OUTPATIENT
Start: 2022-02-14 | End: 2022-02-14 | Stop reason: SURG

## 2022-02-14 RX ORDER — DOCUSATE SODIUM 250 MG
250 CAPSULE ORAL 2 TIMES DAILY PRN
Qty: 15 CAPSULE | Refills: 1 | Status: SHIPPED | OUTPATIENT
Start: 2022-02-14

## 2022-02-14 RX ORDER — ONDANSETRON 2 MG/ML
4 INJECTION INTRAMUSCULAR; INTRAVENOUS ONCE AS NEEDED
Status: DISCONTINUED | OUTPATIENT
Start: 2022-02-14 | End: 2022-02-14 | Stop reason: HOSPADM

## 2022-02-14 RX ORDER — HYDRALAZINE HYDROCHLORIDE 20 MG/ML
5 INJECTION INTRAMUSCULAR; INTRAVENOUS
Status: DISCONTINUED | OUTPATIENT
Start: 2022-02-14 | End: 2022-02-14 | Stop reason: HOSPADM

## 2022-02-14 RX ORDER — OXYCODONE AND ACETAMINOPHEN 7.5; 325 MG/1; MG/1
1 TABLET ORAL EVERY 4 HOURS PRN
Status: DISCONTINUED | OUTPATIENT
Start: 2022-02-14 | End: 2022-02-14 | Stop reason: HOSPADM

## 2022-02-14 RX ORDER — NALOXONE HCL 0.4 MG/ML
0.2 VIAL (ML) INJECTION AS NEEDED
Status: DISCONTINUED | OUTPATIENT
Start: 2022-02-14 | End: 2022-02-14 | Stop reason: HOSPADM

## 2022-02-14 RX ORDER — HYDROCODONE BITARTRATE AND ACETAMINOPHEN 5; 325 MG/1; MG/1
1 TABLET ORAL ONCE AS NEEDED
Status: DISCONTINUED | OUTPATIENT
Start: 2022-02-14 | End: 2022-02-14 | Stop reason: HOSPADM

## 2022-02-14 RX ORDER — HYDROCODONE BITARTRATE AND ACETAMINOPHEN 7.5; 325 MG/1; MG/1
1 TABLET ORAL ONCE AS NEEDED
Status: DISCONTINUED | OUTPATIENT
Start: 2022-02-14 | End: 2022-02-14 | Stop reason: HOSPADM

## 2022-02-14 RX ORDER — DIPHENHYDRAMINE HYDROCHLORIDE 50 MG/ML
12.5 INJECTION INTRAMUSCULAR; INTRAVENOUS
Status: DISCONTINUED | OUTPATIENT
Start: 2022-02-14 | End: 2022-02-14 | Stop reason: HOSPADM

## 2022-02-14 RX ORDER — GABAPENTIN 300 MG/1
300 CAPSULE ORAL ONCE
Status: COMPLETED | OUTPATIENT
Start: 2022-02-14 | End: 2022-02-14

## 2022-02-14 RX ORDER — PROPOFOL 10 MG/ML
VIAL (ML) INTRAVENOUS AS NEEDED
Status: DISCONTINUED | OUTPATIENT
Start: 2022-02-14 | End: 2022-02-14 | Stop reason: SURG

## 2022-02-14 RX ORDER — LIDOCAINE HYDROCHLORIDE 10 MG/ML
0.5 INJECTION, SOLUTION EPIDURAL; INFILTRATION; INTRACAUDAL; PERINEURAL ONCE AS NEEDED
Status: DISCONTINUED | OUTPATIENT
Start: 2022-02-14 | End: 2022-02-14 | Stop reason: HOSPADM

## 2022-02-14 RX ORDER — SODIUM CHLORIDE, SODIUM LACTATE, POTASSIUM CHLORIDE, CALCIUM CHLORIDE 600; 310; 30; 20 MG/100ML; MG/100ML; MG/100ML; MG/100ML
125 INJECTION, SOLUTION INTRAVENOUS CONTINUOUS
Status: DISCONTINUED | OUTPATIENT
Start: 2022-02-14 | End: 2022-02-14 | Stop reason: HOSPADM

## 2022-02-14 RX ORDER — OXYCODONE HYDROCHLORIDE 5 MG/1
10 TABLET ORAL ONCE
Status: COMPLETED | OUTPATIENT
Start: 2022-02-14 | End: 2022-02-14

## 2022-02-14 RX ORDER — DEXAMETHASONE SODIUM PHOSPHATE 10 MG/ML
INJECTION INTRAMUSCULAR; INTRAVENOUS AS NEEDED
Status: DISCONTINUED | OUTPATIENT
Start: 2022-02-14 | End: 2022-02-14 | Stop reason: SURG

## 2022-02-14 RX ORDER — FAMOTIDINE 10 MG/ML
20 INJECTION, SOLUTION INTRAVENOUS ONCE
Status: COMPLETED | OUTPATIENT
Start: 2022-02-14 | End: 2022-02-14

## 2022-02-14 RX ORDER — HYDROMORPHONE HYDROCHLORIDE 1 MG/ML
0.5 INJECTION, SOLUTION INTRAMUSCULAR; INTRAVENOUS; SUBCUTANEOUS
Status: DISCONTINUED | OUTPATIENT
Start: 2022-02-14 | End: 2022-02-14 | Stop reason: HOSPADM

## 2022-02-14 RX ORDER — SODIUM CHLORIDE 0.9 % (FLUSH) 0.9 %
3-10 SYRINGE (ML) INJECTION AS NEEDED
Status: DISCONTINUED | OUTPATIENT
Start: 2022-02-14 | End: 2022-02-14 | Stop reason: HOSPADM

## 2022-02-14 RX ORDER — ROCURONIUM BROMIDE 10 MG/ML
INJECTION, SOLUTION INTRAVENOUS AS NEEDED
Status: DISCONTINUED | OUTPATIENT
Start: 2022-02-14 | End: 2022-02-14 | Stop reason: SURG

## 2022-02-14 RX ORDER — NEOSTIGMINE METHYLSULFATE 0.5 MG/ML
INJECTION, SOLUTION INTRAVENOUS AS NEEDED
Status: DISCONTINUED | OUTPATIENT
Start: 2022-02-14 | End: 2022-02-14 | Stop reason: SURG

## 2022-02-14 RX ORDER — EPHEDRINE SULFATE 50 MG/ML
INJECTION, SOLUTION INTRAVENOUS AS NEEDED
Status: DISCONTINUED | OUTPATIENT
Start: 2022-02-14 | End: 2022-02-14 | Stop reason: SURG

## 2022-02-14 RX ORDER — PROMETHAZINE HYDROCHLORIDE 25 MG/1
25 SUPPOSITORY RECTAL ONCE AS NEEDED
Status: DISCONTINUED | OUTPATIENT
Start: 2022-02-14 | End: 2022-02-14 | Stop reason: HOSPADM

## 2022-02-14 RX ORDER — DOXYCYCLINE 100 MG/1
100 CAPSULE ORAL 2 TIMES DAILY
Qty: 10 CAPSULE | Refills: 0 | Status: SHIPPED | OUTPATIENT
Start: 2022-02-14 | End: 2022-02-19

## 2022-02-14 RX ORDER — ONDANSETRON 8 MG/1
8 TABLET, ORALLY DISINTEGRATING ORAL EVERY 8 HOURS PRN
Qty: 10 TABLET | Refills: 1 | Status: SHIPPED | OUTPATIENT
Start: 2022-02-14

## 2022-02-14 RX ORDER — EPHEDRINE SULFATE 50 MG/ML
5 INJECTION, SOLUTION INTRAVENOUS ONCE AS NEEDED
Status: DISCONTINUED | OUTPATIENT
Start: 2022-02-14 | End: 2022-02-14 | Stop reason: HOSPADM

## 2022-02-14 RX ORDER — AMOXICILLIN 250 MG
2 CAPSULE ORAL DAILY PRN
Qty: 30 TABLET | Refills: 1 | Status: SHIPPED | OUTPATIENT
Start: 2022-02-14 | End: 2023-02-14

## 2022-02-14 RX ORDER — MIDAZOLAM HYDROCHLORIDE 1 MG/ML
1 INJECTION INTRAMUSCULAR; INTRAVENOUS
Status: DISCONTINUED | OUTPATIENT
Start: 2022-02-14 | End: 2022-02-14 | Stop reason: HOSPADM

## 2022-02-14 RX ORDER — MULTIPLE VITAMINS W/ MINERALS TAB 9MG-400MCG
1 TAB ORAL DAILY
COMMUNITY
End: 2022-02-14 | Stop reason: HOSPADM

## 2022-02-14 RX ORDER — PROMETHAZINE HYDROCHLORIDE 25 MG/1
25 TABLET ORAL ONCE AS NEEDED
Status: DISCONTINUED | OUTPATIENT
Start: 2022-02-14 | End: 2022-02-14 | Stop reason: HOSPADM

## 2022-02-14 RX ORDER — LIDOCAINE HYDROCHLORIDE 20 MG/ML
INJECTION, SOLUTION INFILTRATION; PERINEURAL AS NEEDED
Status: DISCONTINUED | OUTPATIENT
Start: 2022-02-14 | End: 2022-02-14 | Stop reason: SURG

## 2022-02-14 RX ORDER — HYDROMORPHONE HCL 110MG/55ML
PATIENT CONTROLLED ANALGESIA SYRINGE INTRAVENOUS AS NEEDED
Status: DISCONTINUED | OUTPATIENT
Start: 2022-02-14 | End: 2022-02-14 | Stop reason: SURG

## 2022-02-14 RX ADMIN — OXYCODONE HYDROCHLORIDE 10 MG: 5 TABLET ORAL at 06:49

## 2022-02-14 RX ADMIN — LIDOCAINE HYDROCHLORIDE 80 MG: 20 INJECTION, SOLUTION INFILTRATION; PERINEURAL at 07:33

## 2022-02-14 RX ADMIN — EPHEDRINE SULFATE 10 MG: 50 INJECTION INTRAVENOUS at 07:47

## 2022-02-14 RX ADMIN — PROPOFOL 180 MG: 10 INJECTION, EMULSION INTRAVENOUS at 07:33

## 2022-02-14 RX ADMIN — SODIUM CHLORIDE, POTASSIUM CHLORIDE, SODIUM LACTATE AND CALCIUM CHLORIDE: 600; 310; 30; 20 INJECTION, SOLUTION INTRAVENOUS at 07:29

## 2022-02-14 RX ADMIN — GABAPENTIN 300 MG: 300 CAPSULE ORAL at 06:49

## 2022-02-14 RX ADMIN — EPHEDRINE SULFATE 10 MG: 50 INJECTION INTRAVENOUS at 09:12

## 2022-02-14 RX ADMIN — FENTANYL CITRATE 50 MCG: 0.05 INJECTION, SOLUTION INTRAMUSCULAR; INTRAVENOUS at 07:58

## 2022-02-14 RX ADMIN — ROCURONIUM BROMIDE 25 MG: 50 INJECTION INTRAVENOUS at 07:33

## 2022-02-14 RX ADMIN — ACETAMINOPHEN 1000 MG: 500 TABLET ORAL at 06:49

## 2022-02-14 RX ADMIN — PROPOFOL 70 MG: 10 INJECTION, EMULSION INTRAVENOUS at 08:34

## 2022-02-14 RX ADMIN — DOXYCYCLINE 200 MG: 100 INJECTION, POWDER, LYOPHILIZED, FOR SOLUTION INTRAVENOUS at 06:55

## 2022-02-14 RX ADMIN — EPHEDRINE SULFATE 10 MG: 50 INJECTION INTRAVENOUS at 08:01

## 2022-02-14 RX ADMIN — SODIUM CHLORIDE, POTASSIUM CHLORIDE, SODIUM LACTATE AND CALCIUM CHLORIDE 125 ML/HR: 600; 310; 30; 20 INJECTION, SOLUTION INTRAVENOUS at 06:29

## 2022-02-14 RX ADMIN — ONDANSETRON HYDROCHLORIDE 8 MG: 8 TABLET, FILM COATED ORAL at 06:49

## 2022-02-14 RX ADMIN — EPHEDRINE SULFATE 10 MG: 50 INJECTION INTRAVENOUS at 09:30

## 2022-02-14 RX ADMIN — DEXAMETHASONE SODIUM PHOSPHATE 10 MG: 10 INJECTION INTRAMUSCULAR; INTRAVENOUS at 07:42

## 2022-02-14 RX ADMIN — GLYCOPYRROLATE 0.2 MG: 0.2 INJECTION INTRAMUSCULAR; INTRAVENOUS at 09:30

## 2022-02-14 RX ADMIN — NEOSTIGMINE METHYLSULFATE 1.5 MG: 0.5 INJECTION INTRAVENOUS at 09:30

## 2022-02-14 RX ADMIN — HYDROMORPHONE HYDROCHLORIDE 0.25 MG: 2 INJECTION, SOLUTION INTRAMUSCULAR; INTRAVENOUS; SUBCUTANEOUS at 08:51

## 2022-02-14 RX ADMIN — HYDROMORPHONE HYDROCHLORIDE 0.25 MG: 2 INJECTION, SOLUTION INTRAMUSCULAR; INTRAVENOUS; SUBCUTANEOUS at 08:39

## 2022-02-14 RX ADMIN — FENTANYL CITRATE 50 MCG: 0.05 INJECTION, SOLUTION INTRAMUSCULAR; INTRAVENOUS at 07:30

## 2022-02-14 RX ADMIN — FAMOTIDINE 20 MG: 10 INJECTION INTRAVENOUS at 06:49

## 2022-02-14 NOTE — ANESTHESIA PROCEDURE NOTES
Airway  Urgency: elective    Date/Time: 2/14/2022 7:37 AM  Airway not difficult    General Information and Staff    Patient location during procedure: OR  CRNA: Yolanda Gutierrez CRNA    Indications and Patient Condition  Indications for airway management: airway protection    Preoxygenated: yes  Mask difficulty assessment: 1 - vent by mask    Final Airway Details  Final airway type: endotracheal airway      Successful airway: ETT  Cuffed: yes   Successful intubation technique: direct laryngoscopy  Facilitating devices/methods: intubating stylet and anterior pressure/BURP  Endotracheal tube insertion site: oral  Blade: Antonia  Blade size: 3  ETT size (mm): 7.0  Cormack-Lehane Classification: grade I - full view of glottis  Placement verified by: capnometry   Measured from: lips  ETT/EBT  to lips (cm): 22  Number of attempts at approach: 1  Assessment: lips, teeth, and gum same as pre-op and atraumatic intubation

## 2022-02-14 NOTE — ANESTHESIA PREPROCEDURE EVALUATION
Anesthesia Evaluation     NPO Solid Status: > 8 hours             Airway   Mallampati: II  TM distance: >3 FB  Neck ROM: full  Dental      Pulmonary - normal exam   (+) asthma (seasonal, hasn't used in months ),  Cardiovascular - normal exam  Exercise tolerance: good (4-7 METS)    ECG reviewed        Neuro/Psych  (+) psychiatric history Anxiety,    GI/Hepatic/Renal/Endo    (+)   thyroid problem hypothyroidism    Musculoskeletal     Abdominal    Substance History      OB/GYN          Other      history of cancer                    Anesthesia Plan    ASA 2     general     intravenous induction     Anesthetic plan, all risks, benefits, and alternatives have been provided, discussed and informed consent has been obtained with: patient.        CODE STATUS:

## 2022-02-14 NOTE — ANESTHESIA POSTPROCEDURE EVALUATION
"Patient: Jessica Wood    Procedure Summary     Date: 02/14/22 Room / Location: [facility] OR 03 / [facility] MAIN OR    Anesthesia Start: 0726 Anesthesia Stop: 1018    Procedures:       BILATERAL REMOVAL OF TISSUE EXPANDERS AND PLACEMEMNT OF IMPLANTS (Bilateral Breast)      FAT GRAFTING (Bilateral )      EXCISION CYST (Right Hand) Diagnosis:     Surgeons: Last Yoder MD Provider: Lisandra Davidson MD    Anesthesia Type: general ASA Status: 2          Anesthesia Type: general    Vitals  Vitals Value Taken Time   /69 02/14/22 1101   Temp 36.4 °C (97.6 °F) 02/14/22 1015   Pulse 99 02/14/22 1114   Resp 18 02/14/22 1100   SpO2 96 % 02/14/22 1114   Vitals shown include unvalidated device data.        Post Anesthesia Care and Evaluation    Patient location during evaluation: bedside  Patient participation: complete - patient participated  Level of consciousness: awake  Pain management: adequate  Airway patency: patent  Anesthetic complications: No anesthetic complications    Cardiovascular status: acceptable  Respiratory status: acceptable  Hydration status: acceptable    Comments: /69 (BP Location: Right arm, Patient Position: Lying)   Pulse 82   Temp 36.4 °C (97.6 °F) (Oral)   Resp 18   Ht 172.7 cm (68\")   Wt 77.1 kg (170 lb)   SpO2 95%   Breastfeeding No   BMI 25.85 kg/m²       "

## 2022-02-15 LAB
LAB AP CASE REPORT: NORMAL
PATH REPORT.FINAL DX SPEC: NORMAL
PATH REPORT.GROSS SPEC: NORMAL

## 2022-03-02 ENCOUNTER — HOSPITAL ENCOUNTER (OUTPATIENT)
Dept: PHYSICAL THERAPY | Facility: HOSPITAL | Age: 62
Setting detail: THERAPIES SERIES
Discharge: HOME | End: 2022-03-02

## 2022-03-02 DIAGNOSIS — Z91.89 AT RISK FOR LYMPHEDEMA: Primary | ICD-10-CM

## 2022-03-02 DIAGNOSIS — D05.12 DUCTAL CARCINOMA IN SITU (DCIS) OF LEFT BREAST: ICD-10-CM

## 2022-03-02 DIAGNOSIS — Z90.13 S/P BILATERAL MASTECTOMY: ICD-10-CM

## 2022-03-02 PROCEDURE — 97110 THERAPEUTIC EXERCISES: CPT

## 2022-03-02 PROCEDURE — 97535 SELF CARE MNGMENT TRAINING: CPT

## 2022-03-02 NOTE — THERAPY DISCHARGE NOTE
Outpatient Physical Therapy Lymphedema Progress Note/Discharge Summary  [facility]     Patient Name: Jessica Wood  : 1960  MRN: [id number]  Today's Date: 3/2/2022      Visit Date: 2022    Visit Dx:    ICD-10-CM ICD-9-CM   1. At risk for lymphedema  Z91.89 V49.89   2. S/P bilateral mastectomy  Z90.13 V45.71   3. Ductal carcinoma in situ (DCIS) of left breast  D05.12 233.0       Patient Active Problem List   Diagnosis   • Anxiety   • Acquired hypothyroidism   • Breast cancer (HCC)                           PT Assessment/Plan     Row Name 22 1422          PT Assessment    Functional Limitations Limitations in functional capacity and performance; Performance in self-care ADL; Limitations in community activities  -LB     Impairments Impaired flexibility; Impaired lymphatic circulation; Muscle strength; Posture; Sensation  -LB     Assessment Comments Pt returns for first follow up. She has now had implant exchange surgery and is doing well. She is interested in advancing her exercise routine and possibly prepare for return to gym exercise. Her stomach is still sore from surgery and she has 10# lifting restrictions. We progressed HEP and discussed safe return to exercise. We reviewed mobilizing implants when cleared by plastic surgeon. Pt understands HEP, compression garment wear, mobilization when appropriate. She has met all goals and is appropriate for d/c today.  -LB     Rehab Potential Good  -LB     Patient/caregiver participated in establishment of treatment plan and goals Yes  -LB            PT Plan    PT Plan Comments d/c to HEP, return if needed.  -LB           User Key  (r) = Recorded By, (t) = Taken By, (c) = Cosigned By    Initials Name Provider Type    Lisandra Chaudhry, PT Physical Therapist                      OP Exercises     Row Name 22 1400             Subjective Comments    Subjective Comments I am doing well. I don't want to wear my sleeve in the gym. I do want to get  back to doing things. I am walking outside alot. 5 miles on Fridays. No issues with pain or ROM. My stomach is a little sore from the implant surgery.  -LB              Subjective Pain    Able to rate subjective pain? yes  -LB      Pre-Treatment Pain Level 0  -LB              Total Minutes    52784 - PT Therapeutic Exercise Minutes 30  -LB              Exercise 1    Exercise Name 1 GLORY: [id number]  -LB              Exercise 2    Exercise Name 2 shoulder rolls  -LB      Reps 2 10  -LB              Exercise 3    Exercise Name 3 scap retraction  -LB      Reps 3 10  -LB      Time 3 5  -LB              Exercise 4    Exercise Name 4 tband row/extension  -LB      Reps 4 10  -LB      Time 4 RTB  -LB              Exercise 5    Exercise Name 5 supine HA/ER  -LB      Reps 5 10  -LB      Time 5 RTB  -LB              Exercise 6    Exercise Name 6 supine shoulder press  -LB      Reps 6 10  -LB      Time 6 2#  -LB              Exercise 7    Exercise Name 7 standing biceps curl  -LB      Reps 7 10  -LB      Time 7 2#  -LB              Exercise 8    Exercise Name 8 supine triceps extension  -LB      Reps 8 10  -LB      Time 8 2#  -LB            User Key  (r) = Recorded By, (t) = Taken By, (c) = Cosigned By    Initials Name Provider Type    Lisandra Chaudhry, PT Physical Therapist                               PT OP Goals     Row Name 03/02/22 1400          Long Term Goals    LTG Date to Achieve 01/14/22  -LB     LTG 1 Pt will demonstrate understanding and compliance with initial HEP.  -LB     LTG 1 Progress Met  -LB     LTG 2 Pt will acquire appropriately fitted compression garment and verbalize appropriate wear schedule.  -LB     LTG 2 Progress Met  -LB     LTG 3 Pt will demonstrate understanding of advanced HEP and feel prepared for return to gym activity.  -LB     LTG 3 Progress Met  -LB     LTG 4 Pt will verbalize s/s and precautions concerning lymphedema to allow continued independent management.  -LB     LTG 4 Progress Met   -LB           User Key  (r) = Recorded By, (t) = Taken By, (c) = Cosigned By    Initials Name Provider Type    LB Lisandra Fajardo, PT Physical Therapist                Therapy Education  Education Details: discussed mobilization of implants when instructed by plastic surgeon, continued aerobic activity, compression garment for travel in airplane and heavy exercise, reviewed s/s of lymphedema  Given: HEP, Mobility training, Symptoms/condition management, Posture/body mechanics  Program: Reinforced, Progressed  How Provided: Verbal, Demonstration, Written  Provided to: Patient  Level of Understanding: Teach back education performed, Verbalized, Demonstrated  04188 - PT Self Care/Mgmt Minutes: 15              Time Calculation:   Start Time: 1245  Stop Time: 1330  Time Calculation (min): 45 min  Total Timed Code Minutes- PT: 40 minute(s)  Timed Charges  02305 - PT Therapeutic Exercise Minutes: 30  18211 - PT Self Care/Mgmt Minutes: 15  Total Minutes  Timed Charges Total Minutes: 15   Total Minutes: 15     Therapy Charges for Today     Code Description Service Date Service Provider Modifiers Qty    [id number]  PT THER PROC EA 15 MIN 3/2/2022 Lisandra Fajardo, PT GP 2    [id number]  PT SELF CARE/MGMT/TRAIN EA 15 MIN 3/2/2022 Lisandra Fajardo, PT GP 1                 OP PT Discharge Summary  Date of Discharge: 03/02/22  Reason for Discharge: All goals achieved, Independent, At baseline function  Outcomes Achieved: Able to achieve all goals within established timeline  Discharge Destination: Home with home program  Discharge Instructions/Additional Comments: see assessment      Lisandra Fajardo PT  3/2/2022

## 2022-03-03 NOTE — OP NOTE
Pre-Operative Diagnosis:   1. acquired absence bilateral breasts  2. Right dorsal hand lesion    Post-Operative Diagnosis: Same    Procedure Performed:   1. Removal bilateral breast tissue expanders and placement of permanent implants (allergan [id number])  2. Autologous fat grafting bilateral breasts (110 cc right breast, 130 cc left breast)  3. Excision right hand vascular lesion 1.5cm diameter    Surgeon: JERRY Yoder MD    Assistant: None    Anesthesia: General    Estimated Blood Loss: <5cc    Specimens: None    Complications: None    Indications: She has completed expansion and is ready for second stage surgery.  Have discussed placement of smooth round cohesive silicone implants and autologous fat grafting for contouring.    We discussed risks, benefits and alternatives including but not limited to: bleeding, infection, asymmetry, poor or slow wound healing, need for further surgery, possible recurrence.  The patient elected to proceed.    Description of Procedure: The patient was met in the preoperative holding area.  All questions were answered and informed consent was assured.      Breast landmarks were drawn and areas of fat grafting harvest were marked on the abdomen.  She was transferred to OR and placed on the table with arms well padded.    After induction of appropriate anesthesia, a timeout was performed correctly identifying the patient, operative site, and procedure to be performed.  All present were in agreement.    Local anesthetic was infiltrated and the chest and abdomen prepped and draped.  Lateral IMF incisions made and carried down to the capsule which was dissected to allow cuff for closure.  The Expanders were evacuated.  The AlloDerm was well integrated.  Lateral thermal capsulorrhaphy performed and sizers placed.  Symmetry was good.  Tumescent was infiltrated in the abdomen.  Suction lipectomy performed with 3 mm cannula with revolve device.  Fat was rinsed three times.  Transferred  to 10 cc syringes and infiltrated in the breasts in small aliquots.  The sizers were removed and pockets rinsed with antibiotic solution and hemostasis achieved.  50% betadine left to dwell for several minutes.  Implants brought on the field and gloves changed and skin reprepped.  Implant placed in the pockets with hollis funnel.  Closure performed with 2-0 vicryl closing the capsule, 3-0 Monocryl deep dermal and 4-0 Monocryl intracuticular.  Incisions dressed with dermabond and Tegaderm and placed in well padded ace wrap and abdominal binder.    Attention was then turned to the right hand.  Local anesthetic was infiltrated and the hand prepped.  A lazy s incision was made over the mass over the dorsal 5th metacarpal.  The lesion was identified in the subcutaneous space and appeared to be a vascular lesion.  It was dissected and the connection to a dorsal vein was isolated and ligated.  The lesion was then removed and hemostasis achieved.  Closure performed with 4-0 monocryl deep dermal and 4-0 nylon interrupted cutaneous sutures.  Dressed with bacitracin and dry dressing.    The patient was then aroused from anesthesia with ease and transferred to the postoperative care area in good condition. All sponge, needle, and instrument counts were correct.

## 2022-03-04 NOTE — TELEPHONE ENCOUNTER
Caller: Israel Jessica S    Relationship: Self    Best call back number: [phone]    Requested Prescriptions:   Requested Prescriptions     Pending Prescriptions Disp Refills   • albuterol sulfate HFA (ProAir HFA) 108 (90 Base) MCG/ACT inhaler       Sig: Inhale 1 puff Every 4 (Four) Hours As Needed for Wheezing (RESCUE).        Pharmacy where request should be sent: [facility]ACY #[id number] [place], [place] - [address] [address] AT [facility] [phone] [facility] [phone]      Additional details provided by patient: IS TOTALLY OUT OF THIS MEDICATION.    Does the patient have less than a 3 day supply:  [x] Yes  [] No    Gabriella Kim, PCT   03/04/22 10:34 EST

## 2022-03-07 NOTE — TELEPHONE ENCOUNTER
PT STATED THAT SHE TESTED POSITIVE FOR COVID WITH AN AT HOME TEST ON  3/4/22 AND NEEDS THIS REFILL ASAP.

## 2022-03-09 RX ORDER — ALBUTEROL SULFATE 90 UG/1
1 AEROSOL, METERED RESPIRATORY (INHALATION) EVERY 4 HOURS PRN
OUTPATIENT
Start: 2022-03-09

## 2022-12-28 ENCOUNTER — OFFICE VISIT (OUTPATIENT)
Dept: FAMILY MEDICINE CLINIC | Facility: CLINIC | Age: 62
End: 2022-12-28

## 2022-12-28 VITALS
HEART RATE: 110 BPM | TEMPERATURE: 98.9 F | OXYGEN SATURATION: 99 % | DIASTOLIC BLOOD PRESSURE: 76 MMHG | BODY MASS INDEX: 25.85 KG/M2 | HEIGHT: 68 IN | RESPIRATION RATE: 16 BRPM | SYSTOLIC BLOOD PRESSURE: 125 MMHG

## 2022-12-28 DIAGNOSIS — J20.9 ACUTE BRONCHITIS, UNSPECIFIED ORGANISM: ICD-10-CM

## 2022-12-28 DIAGNOSIS — R05.1 ACUTE COUGH: Primary | ICD-10-CM

## 2022-12-28 DIAGNOSIS — R05.9 COUGH, UNSPECIFIED TYPE: Primary | ICD-10-CM

## 2022-12-28 LAB
EXPIRATION DATE: NORMAL
FLUAV AG UPPER RESP QL IA.RAPID: NOT DETECTED
FLUBV AG UPPER RESP QL IA.RAPID: NOT DETECTED
INTERNAL CONTROL: NORMAL
Lab: NORMAL
SARS-COV-2 RNA RESP QL NAA+PROBE: NOT DETECTED

## 2022-12-28 PROCEDURE — 99203 OFFICE O/P NEW LOW 30 MIN: CPT | Performed by: STUDENT IN AN ORGANIZED HEALTH CARE EDUCATION/TRAINING PROGRAM

## 2022-12-28 PROCEDURE — 87428 SARSCOV & INF VIR A&B AG IA: CPT | Performed by: STUDENT IN AN ORGANIZED HEALTH CARE EDUCATION/TRAINING PROGRAM

## 2022-12-28 RX ORDER — AZITHROMYCIN 250 MG/1
TABLET, FILM COATED ORAL
Qty: 6 TABLET | Refills: 0 | Status: SHIPPED | OUTPATIENT
Start: 2022-12-28

## 2022-12-28 RX ORDER — BENZONATATE 100 MG/1
100 CAPSULE ORAL 3 TIMES DAILY PRN
Qty: 21 CAPSULE | Refills: 0 | Status: SHIPPED | OUTPATIENT
Start: 2022-12-28 | End: 2023-01-04

## 2022-12-28 RX ORDER — OMEGA-3 FATTY ACIDS CAP DELAYED RELEASE 1000 MG 1000 MG
CAPSULE DELAYED RELEASE ORAL
COMMUNITY

## 2022-12-28 NOTE — PROGRESS NOTES
"Chief Complaint  Cough (Congestion-shortness of breath-delsum at night)    Subjective        Jessica Wood presents to [facility]  History of Present Illness  For cough, chest congestion, on and off shortness of breath especially after coughing.  Patient stated she is using albuterol inhaler but does not feel like it is helping a lot.  Review of system is negative for fever, headache, chest pain,palpitation, nausea, vomiting, any recent change in bladder habits.        Objective   Vital Signs:  /76   Pulse 110   Temp 98.9 °F (37.2 °C)   Resp 16   Ht 172.7 cm (67.99\")   SpO2 99%   BMI 25.85 kg/m²   Estimated body mass index is 25.85 kg/m² as calculated from the following:    Height as of this encounter: 172.7 cm (67.99\").    Weight as of 2/14/22: 77.1 kg (170 lb).          Physical Exam  HENT:      Head: Normocephalic and atraumatic.      Mouth/Throat:      Mouth: Mucous membranes are moist.      Pharynx: Oropharynx is clear.   Eyes:      Extraocular Movements: Extraocular movements intact.      Conjunctiva/sclera: Conjunctivae normal.      Pupils: Pupils are equal, round, and reactive to light.   Cardiovascular:      Rate and Rhythm: Normal rate and regular rhythm.   Pulmonary:      Effort: Pulmonary effort is normal.      Breath sounds: Normal breath sounds.   Abdominal:      General: Bowel sounds are normal.      Palpations: Abdomen is soft.   Musculoskeletal:         General: Normal range of motion.      Cervical back: Neck supple.   Skin:     General: Skin is warm.      Capillary Refill: Capillary refill takes less than 2 seconds.   Neurological:      General: No focal deficit present.      Mental Status: She is alert and oriented to person, place, and time. Mental status is at baseline.   Psychiatric:         Mood and Affect: Mood normal.        Result Review :                Assessment and Plan   Diagnoses and all orders for this visit:    1. Acute cough (Primary)  -    "  benzonatate (TESSALON) 100 MG capsule; Take 1 capsule by mouth 3 (Three) Times a Day As Needed for Cough for up to 7 days.  Dispense: 21 capsule; Refill: 0    2. Acute bronchitis, unspecified organism  -     azithromycin (Zithromax Z-Khris) 250 MG tablet; Take 2 tablets by mouth on day 1, then 1 tablet daily on days 2-5  Dispense: 6 tablet; Refill: 0    Looks like viral related upper respiratory tract infection.  Patient  recently tested positive for COVID so it is possible that patient test is falsely negative.  Patient is advised to monitor symptoms for now.  Can take Tessalon capsule as needed for cough.  If no improvement in the symptoms seen in coming 2 to 3 days then patient can take Z-Khris  Warning symptoms like shortness of breath, using neck muscles for breathing, unable to speak in full sentences should not be ignored and patient should go immediately to ER for further evaluation.           Follow Up   No follow-ups on file.  Patient was given instructions and counseling regarding her condition or for health maintenance advice. Please see specific information pulled into the AVS if appropriate.

## 2023-01-05 ENCOUNTER — LAB (OUTPATIENT)
Dept: OTHER | Facility: HOSPITAL | Age: 63
End: 2023-01-05
Payer: COMMERCIAL

## 2023-01-05 ENCOUNTER — OFFICE VISIT (OUTPATIENT)
Dept: ONCOLOGY | Facility: CLINIC | Age: 63
End: 2023-01-05
Payer: COMMERCIAL

## 2023-01-05 VITALS
OXYGEN SATURATION: 97 % | TEMPERATURE: 98 F | SYSTOLIC BLOOD PRESSURE: 122 MMHG | DIASTOLIC BLOOD PRESSURE: 74 MMHG | HEIGHT: 68 IN | RESPIRATION RATE: 18 BRPM | BODY MASS INDEX: 26.96 KG/M2 | HEART RATE: 100 BPM | WEIGHT: 177.9 LBS

## 2023-01-05 DIAGNOSIS — D05.12 DUCTAL CARCINOMA IN SITU (DCIS) OF LEFT BREAST: ICD-10-CM

## 2023-01-05 DIAGNOSIS — D05.12 DUCTAL CARCINOMA IN SITU (DCIS) OF LEFT BREAST: Primary | ICD-10-CM

## 2023-01-05 PROCEDURE — 36415 COLL VENOUS BLD VENIPUNCTURE: CPT

## 2023-01-05 PROCEDURE — 99214 OFFICE O/P EST MOD 30 MIN: CPT | Performed by: INTERNAL MEDICINE

## 2023-01-05 RX ORDER — FLUCONAZOLE 200 MG/1
TABLET ORAL
COMMUNITY
Start: 2023-01-04

## 2023-01-05 RX ORDER — MULTIPLE VITAMINS W/ MINERALS TAB 9MG-400MCG
1 TAB ORAL DAILY
COMMUNITY

## 2023-01-05 NOTE — PROGRESS NOTES
Subjective   Jessica Wood is a 62 y.o. female.  Referred by Dr. Dillard for ductal carcinoma in situ    History of Present Illness   Ms. Wood is a 62-year-old postmenopausal  lady with a strong family history of breast cancer with her sister being diagnosed with breast cancer in her 30s and bilateral breast cancer, her mother with history of bilateral breast cancer.  Patient has been undergoing screening mammograms and MRIs every 6 monthly.  Her most recent mammogram in February 2021 this year was normal.  8/24/2021 MRI of the breast showed an area of non-mass enhancement measuring 1.3 x 0.4 x 1.5 cm in the left breast.  A biopsy of this was recommended.  Biopsy this area was consistent with ductal carcinoma in situ, high-grade,ER +4%, CO weakly positive, Ki-67 30%.  Due to strong family history patient opted to proceed with bilateral mastectomy and reconstruction.  She is status post bilateral mastectomy.  11/10/2021 pathology shows ductal carcinoma in situ measuring 10 x 1 x 1 mm in the left breast, ER positive for percent, CO +2% weak, Ki-67 30%.  Right breast benign.  3 sentinel lymph nodes on the left negative.  She is recovering well from surgery and undergoing reconstruction.  She is here to discuss adjuvant therapy.  Prior to the diagnosis of DCIS she was on Evista for risk reduction.  She had a normal bone density scan in 2016.  She has a 34-year-old daughter and she is concerned about the risk of breast cancer in her daughter.  She has 1 son.  No family history of ovarian, pancreatic, prostate cancer or melanomas.  Maternal grandmother with lung cancer but she was a smoker.    Interval history  Ms. Wood presents to the clinic today for follow-up.  She has no new complaints.  No new chest wall lesions.  She is requesting that we perform an 9 gene stat panel to assess for breast cancer genes only.    The following portions of the patient's history were reviewed and updated as appropriate: allergies,  current medications, past family history, past medical history, past social history, past surgical history and problem list.    Past Medical History:   Diagnosis Date   • Allergic    • Anxiety    • Asthma due to seasonal allergies    • Breast cancer (HCC)     LEFT BREAST. SURGERY   • Cyst of joint of right hand    • Hypothyroidism     COMPLETE THYROIDECTOMY BENIGN THYROID MASS   • IBS (irritable bowel syndrome)    • Seasonal allergies         Past Surgical History:   Procedure Laterality Date   • BREAST BIOPSY Left    • BREAST RECONSTRUCTION Bilateral 11/10/2021    Procedure: BILATERAL PLACEMENT OF TISSUE EXPANDERS AND ALLODERM;  Surgeon: Last Yoder MD;  Location: [facility];  Service: Plastics;  Laterality: Bilateral;   • BREAST TISSUE EXPANDER REMOVAL INSERTION OF IMPLANT Bilateral 2022    Procedure: BILATERAL REMOVAL OF TISSUE EXPANDERS AND PLACEMEMNT OF IMPLANTS;  Surgeon: Last Yoder MD;  Location: [facility] OR;  Service: Plastics;  Laterality: Bilateral;   •  SECTION N/A    • COLONOSCOPY     • CYST REMOVAL Right 2022    Procedure: EXCISION CYST;  Surgeon: Last Yoder MD;  Location: [facility] OR;  Service: Plastics;  Laterality: Right;   • FAT GRAFTING Bilateral 2022    Procedure: FAT GRAFTING;  Surgeon: Last Yoder MD;  Location: [facility] OR;  Service: Plastics;  Laterality: Bilateral;   • LYMPH NODE BIOPSY  11/10/21   • MASTECTOMY W/ SENTINEL NODE BIOPSY Bilateral 11/10/2021    Procedure: bilateral MASTECTOMY WITH SENTINEL NODE BIOPSY;  Surgeon: Dede Dillard MD;  Location: [facility];  Service: General;  Laterality: Bilateral;   • TOTAL THYROIDECTOMY          Family History   Problem Relation Age of Onset   • Breast cancer Mother    • Breast cancer Sister    • Miscarriages / Stillbirths Sister    • Malig Hyperthermia Neg Hx         Social History     Socioeconomic History   • Marital status:     Tobacco Use   • Smoking status: Never   • Smokeless tobacco: Never   Vaping Use   • Vaping Use: Never used   Substance and Sexual Activity   • Alcohol use: Yes     Alcohol/week: 2.0 standard drinks     Types: 2 Glasses of wine per week   • Drug use: Never   • Sexual activity: Yes     Partners: Male     Birth control/protection: Vasectomy        OB History        2    Para   2    Term   2            AB        Living           SAB        IAB        Ectopic        Molar        Multiple        Live Births                 Age of menarche-14  Age at first live childbirth-26   2 para 2  0  Age at menopause-54  No use of hormone replacement therapy oral contraceptive pills  She had infertility issues for which she had to use Clomid.    Allergies   Allergen Reactions   • Sulfa Antibiotics Hives     Hives, itching, swelling   • Latex Hives     Hives and itching            Review of Systems   Constitutional: Negative.    HENT: Negative.    Eyes: Negative.    Respiratory: Negative.    Cardiovascular: Negative.    Gastrointestinal: Negative.    Genitourinary: Positive for amenorrhea.   Musculoskeletal: Negative.    Allergic/Immunologic: Negative.    Neurological: Negative.    Hematological: Negative.    Psychiatric/Behavioral: Negative.      Review of systems as mentioned in the HPI    Objective   Temperature 98 °F (36.7 °C), temperature source Temporal, height 172 cm (67.72\"), weight 80.7 kg (177 lb 14.4 oz), not currently breastfeeding.   Physical Exam  Vitals reviewed.   Constitutional:       Appearance: Normal appearance.   HENT:      Head: Normocephalic and atraumatic.      Right Ear: External ear normal.      Left Ear: External ear normal.      Nose: Nose normal.      Mouth/Throat:      Mouth: Mucous membranes are moist.   Eyes:      Conjunctiva/sclera: Conjunctivae normal.      Pupils: Pupils are equal, round, and reactive to light.   Cardiovascular:      Rate and Rhythm: Normal rate and  regular rhythm.   Pulmonary:      Effort: Pulmonary effort is normal.   Abdominal:      General: Abdomen is flat.   Musculoskeletal:         General: Normal range of motion.      Cervical back: Normal range of motion.   Skin:     General: Skin is warm.   Neurological:      General: No focal deficit present.      Mental Status: She is alert and oriented to person, place, and time.   Psychiatric:         Mood and Affect: Mood normal.         Behavior: Behavior normal.         Thought Content: Thought content normal.         Judgment: Judgment normal.       Breast Exam: Status post bilateral mastectomy with reconstruction    I have reexamined the patient and the results are consistent with the previously documented exam. Sandrine Landaverde MD     Orders Only on 12/28/2022   Component Date Value Ref Range Status   • COVID19 12/28/2022 Not Detected  Not Detected - Ref. Range Final   • Influenza A Antigen DUANE 12/28/2022 Not Detected  Not Detected Final   • Influenza B Antigen DUANE 12/28/2022 Not Detected  Not Detected Final   • Internal Control 12/28/2022 Passed  Passed Final   • Lot Number 12/28/2022 [id number]   Final   • Expiration Date 12/28/2022 [id number]   Final        No radiology results for the last 30 days.     CBC and CMP reviewed and relatively normal    Assessment & Plan       *Left breast DCIS  · High-grade, weakly ER/NV positive/almost, receptor negative  · She is status post bilateral mastectomy  · Explained to her regarding the pathology and the fact that with DCIS there is risk of local recurrence or new invasive breast cancer ipsilaterally and contralaterally but now that she has had a bilateral mastectomy this would not be risk anymore.  · Therefore she does not need any adjuvant endocrine therapy.  · No evidence of recurrent disease.    *Family history of breast cancer  · Her sister underwent genetic testing and it was negative  · Patient has been referred for genetic testing however she does not want to  perform 47 gene panel.  · She only wants to know about the breast cancer genes which it would probably be enough to do the 9 gene panel.  · She plans to proceed with genetic testing today.  This has been ordered    *Follow-up-1 year

## 2023-01-13 LAB — REF LAB TEST RESULTS: NORMAL

## 2023-01-19 ENCOUNTER — TELEPHONE (OUTPATIENT)
Dept: ONCOLOGY | Facility: CLINIC | Age: 63
End: 2023-01-19
Payer: COMMERCIAL

## 2023-01-19 NOTE — TELEPHONE ENCOUNTER
Called Jessica to notify her that her genetic testing was negative per Dr Landaverde's request.  She states she saw these on her MyChart. No other questions or cancers.

## 2023-05-04 NOTE — TELEPHONE ENCOUNTER
Pt would like for you to call her about her upcoming surgery in nov    Ketoconazole Pregnancy And Lactation Text: This medication is Pregnancy Category C and it isn't know if it is safe during pregnancy. It is also excreted in breast milk and breast feeding isn't recommended.

## 2023-10-30 ENCOUNTER — TELEPHONE (OUTPATIENT)
Dept: FAMILY MEDICINE CLINIC | Facility: CLINIC | Age: 63
End: 2023-10-30
Payer: COMMERCIAL

## 2023-10-30 NOTE — TELEPHONE ENCOUNTER
LVM informing patient  says she needs appointment.     says patient has not been seen since 2021 by him.

## 2023-10-30 NOTE — TELEPHONE ENCOUNTER
Caller: Jessica Wood    Relationship: Self    Best call back number: [phone]     What medication are you requesting: ANTIBIOTIC    What are your current symptoms: BURNING WHILE URINATING     How long have you been experiencing symptoms: 2 DAYS    Have you had these symptoms before:    [x] Yes  [] No    Have you been treated for these symptoms before:   [x] Yes  [] No    If a prescription is needed, what is your preferred pharmacy and phone number: [facility] [address] [facility] [phone] [facility] [phone] FX     Additional notes: PATIENT STATES SHE HAS A UTI AND IS REQUESTING A PRESCRIPTION TO TREAT. PATIENT WAS ON VACATION & WAS IN A WET SWIM SUIT A LOT. PATIENT STATES SHE HAS A VERY BUSY WEEK & CAN NOT COME IN. PLEASE CALL PATIENT ONCE REQUEST HAS BEEN APPROVED OR DENIED.

## 2024-01-04 ENCOUNTER — OFFICE VISIT (OUTPATIENT)
Dept: ONCOLOGY | Facility: CLINIC | Age: 64
End: 2024-01-04
Payer: COMMERCIAL

## 2024-01-04 VITALS
WEIGHT: 160 LBS | HEIGHT: 68 IN | TEMPERATURE: 97.7 F | BODY MASS INDEX: 24.25 KG/M2 | SYSTOLIC BLOOD PRESSURE: 115 MMHG | RESPIRATION RATE: 16 BRPM | DIASTOLIC BLOOD PRESSURE: 69 MMHG | HEART RATE: 83 BPM | OXYGEN SATURATION: 94 %

## 2024-01-04 DIAGNOSIS — F41.9 ANXIETY: Primary | ICD-10-CM

## 2024-01-04 DIAGNOSIS — C50.919 MALIGNANT NEOPLASM OF FEMALE BREAST, UNSPECIFIED ESTROGEN RECEPTOR STATUS, UNSPECIFIED LATERALITY, UNSPECIFIED SITE OF BREAST: ICD-10-CM

## 2024-01-04 RX ORDER — MULTIVIT-MIN/IRON/FOLIC ACID/K 18-600-40
500 CAPSULE ORAL DAILY
COMMUNITY
Start: 2023-08-15

## 2024-01-04 NOTE — PROGRESS NOTES
Subjective   Jessica Wood is a 63 y.o. female.  Referred by Dr. Dillard for ductal carcinoma in situ    History of Present Illness   Ms. Wood is a 62-year-old postmenopausal  lady with a strong family history of breast cancer with her sister being diagnosed with breast cancer in her 30s and bilateral breast cancer, her mother with history of bilateral breast cancer.  Patient has been undergoing screening mammograms and MRIs every 6 monthly.  Her most recent mammogram in February 2021 this year was normal.  8/24/2021 MRI of the breast showed an area of non-mass enhancement measuring 1.3 x 0.4 x 1.5 cm in the left breast.  A biopsy of this was recommended.  Biopsy this area was consistent with ductal carcinoma in situ, high-grade,ER +4%, KY weakly positive, Ki-67 30%.  Due to strong family history patient opted to proceed with bilateral mastectomy and reconstruction.  She is status post bilateral mastectomy.  11/10/2021 pathology shows ductal carcinoma in situ measuring 10 x 1 x 1 mm in the left breast, ER positive for percent, KY +2% weak, Ki-67 30%.  Right breast benign.  3 sentinel lymph nodes on the left negative.  She is recovering well from surgery and undergoing reconstruction.  She is here to discuss adjuvant therapy.  Prior to the diagnosis of DCIS she was on Evista for risk reduction.  She had a normal bone density scan in 2016.  She has a 34-year-old daughter and she is concerned about the risk of breast cancer in her daughter.  She has 1 son.  No family history of ovarian, pancreatic, prostate cancer or melanomas.  Maternal grandmother with lung cancer but she was a smoker.    Interval history  Ms. Wood presents to the clinic today for follow-up.  She has lost about 25 pounds since her last visit.  She has been exercising and modified her diet.  She has been dealing with a URI and has some greenish nasal drainage but no fevers chills or dyspnea.  No other complaints at this time.  No new chest wall  lesions.    The following portions of the patient's history were reviewed and updated as appropriate: allergies, current medications, past family history, past medical history, past social history, past surgical history and problem list.    Past Medical History:   Diagnosis Date    Allergic     Anxiety     Asthma due to seasonal allergies     Breast cancer     LEFT BREAST. SURGERY    Cyst of joint of right hand     Hypothyroidism     COMPLETE THYROIDECTOMY BENIGN THYROID MASS    IBS (irritable bowel syndrome)     Seasonal allergies         Past Surgical History:   Procedure Laterality Date    BREAST BIOPSY Left     BREAST RECONSTRUCTION Bilateral 11/10/2021    Procedure: BILATERAL PLACEMENT OF TISSUE EXPANDERS AND ALLODERM;  Surgeon: Last Yoder MD;  Location: [facility];  Service: Plastics;  Laterality: Bilateral;    BREAST TISSUE EXPANDER REMOVAL INSERTION OF IMPLANT Bilateral 2022    Procedure: BILATERAL REMOVAL OF TISSUE EXPANDERS AND PLACEMEMNT OF IMPLANTS;  Surgeon: Last Yoder MD;  Location: [facility];  Service: Plastics;  Laterality: Bilateral;     SECTION N/A     COLONOSCOPY      CYST REMOVAL Right 2022    Procedure: EXCISION CYST;  Surgeon: Last Yoder MD;  Location: [facility] OR;  Service: Plastics;  Laterality: Right;    FAT GRAFTING Bilateral 2022    Procedure: FAT GRAFTING;  Surgeon: Last Yoder MD;  Location: [facility] OR;  Service: Plastics;  Laterality: Bilateral;    LYMPH NODE BIOPSY  11/10/21    MASTECTOMY W/ SENTINEL NODE BIOPSY Bilateral 11/10/2021    Procedure: bilateral MASTECTOMY WITH SENTINEL NODE BIOPSY;  Surgeon: Dede Dillard MD;  Location: [facility];  Service: General;  Laterality: Bilateral;    TOTAL THYROIDECTOMY          Family History   Problem Relation Age of Onset    Breast cancer Mother     Breast cancer Sister     Miscarriages / Stillbirths Sister     Malig Hyperthermia Neg  "Hx         Social History     Socioeconomic History    Marital status:    Tobacco Use    Smoking status: Never    Smokeless tobacco: Never   Vaping Use    Vaping Use: Never used   Substance and Sexual Activity    Alcohol use: Yes     Alcohol/week: 2.0 standard drinks of alcohol     Types: 2 Glasses of wine per week    Drug use: Never    Sexual activity: Yes     Partners: Male     Birth control/protection: Vasectomy        OB History          2    Para   2    Term   2            AB        Living             SAB        IAB        Ectopic        Molar        Multiple        Live Births                 Age of menarche-14  Age at first live childbirth-26   2 para 2  0  Age at menopause-54  No use of hormone replacement therapy oral contraceptive pills  She had infertility issues for which she had to use Clomid.    Allergies   Allergen Reactions    Sulfa Antibiotics Hives     Hives, itching, swelling    Latex Hives     Hives and itching            Review of Systems   Constitutional: Negative.    HENT: Negative.     Eyes: Negative.    Respiratory: Negative.     Cardiovascular: Negative.    Gastrointestinal: Negative.    Genitourinary:  Positive for amenorrhea.   Musculoskeletal: Negative.    Allergic/Immunologic: Negative.    Neurological: Negative.    Hematological: Negative.    Psychiatric/Behavioral: Negative.       Review of systems as mentioned in the HPI otherwise negative    Objective   Blood pressure 115/69, pulse 83, temperature 97.7 °F (36.5 °C), temperature source Temporal, resp. rate 16, height 172 cm (67.72\"), weight 72.6 kg (160 lb), SpO2 94%, not currently breastfeeding.   Physical Exam  Vitals reviewed.   Constitutional:       Appearance: Normal appearance.   HENT:      Head: Normocephalic and atraumatic.      Right Ear: External ear normal.      Left Ear: External ear normal.      Nose: Nose normal.      Mouth/Throat:      Mouth: Mucous membranes are moist.   Eyes:      " Conjunctiva/sclera: Conjunctivae normal.      Pupils: Pupils are equal, round, and reactive to light.   Cardiovascular:      Rate and Rhythm: Normal rate and regular rhythm.   Pulmonary:      Effort: Pulmonary effort is normal.   Abdominal:      General: Abdomen is flat.   Musculoskeletal:         General: Normal range of motion.      Cervical back: Normal range of motion.   Skin:     General: Skin is warm.   Neurological:      General: No focal deficit present.      Mental Status: She is alert and oriented to person, place, and time.   Psychiatric:         Mood and Affect: Mood normal.         Behavior: Behavior normal.         Thought Content: Thought content normal.         Judgment: Judgment normal.       Breast Exam: Status post bilateral mastectomy with reconstruction    I have reexamined the patient and the results are consistent with the previously documented exam. Sandrine Landaverde MD      No visits with results within 30 Day(s) from this visit.   Latest known visit with results is:   Lab on 01/05/2023   Component Date Value Ref Range Status    Miscellaneous Lab Test Result 01/05/2023 See attached report   Final        No radiology results for the last 30 days.     CBC and CMP reviewed and relatively normal    Assessment & Plan       *Left breast DCIS  High-grade, weakly ER/NC positive/almost, receptor negative  She is status post bilateral mastectomy  Explained to her regarding the pathology and the fact that with DCIS there is risk of local recurrence or new invasive breast cancer ipsilaterally and contralaterally but now that she has had a bilateral mastectomy this would not be risk anymore.  Therefore she does not need any adjuvant endocrine therapy.  No evidence of recurrent disease.    *Family history of breast cancer  9 gene stat panel with [facility] performed and negative    *URI  Most likely viral  Discussed symptomatic treatment.  No clear indication for antibiotics at this time    *Follow-up-1  year

## 2024-09-23 ENCOUNTER — PREP FOR SURGERY (OUTPATIENT)
Dept: OTHER | Facility: HOSPITAL | Age: 64
End: 2024-09-23
Payer: COMMERCIAL

## 2024-09-23 DIAGNOSIS — Z12.11 SCREEN FOR COLON CANCER: Primary | ICD-10-CM

## 2024-09-30 PROBLEM — Z12.11 SCREEN FOR COLON CANCER: Status: ACTIVE | Noted: 2024-09-23

## 2024-11-14 RX ORDER — LANOLIN ALCOHOL/MO/W.PET/CERES
400 CREAM (GRAM) TOPICAL DAILY
COMMUNITY

## 2024-11-14 RX ORDER — LORATADINE 10 MG/1
10 TABLET ORAL DAILY
COMMUNITY

## 2024-11-18 ENCOUNTER — ANESTHESIA (OUTPATIENT)
Dept: GASTROENTEROLOGY | Facility: HOSPITAL | Age: 64
End: 2024-11-18
Payer: COMMERCIAL

## 2024-11-18 ENCOUNTER — HOSPITAL ENCOUNTER (OUTPATIENT)
Facility: HOSPITAL | Age: 64
Setting detail: HOSPITAL OUTPATIENT SURGERY
Discharge: HOME | End: 2024-11-18
Attending: SURGERY | Admitting: SURGERY
Payer: COMMERCIAL

## 2024-11-18 ENCOUNTER — ANESTHESIA EVENT (OUTPATIENT)
Dept: GASTROENTEROLOGY | Facility: HOSPITAL | Age: 64
End: 2024-11-18
Payer: COMMERCIAL

## 2024-11-18 VITALS
OXYGEN SATURATION: 98 % | BODY MASS INDEX: 28.01 KG/M2 | SYSTOLIC BLOOD PRESSURE: 111 MMHG | RESPIRATION RATE: 16 BRPM | HEART RATE: 65 BPM | TEMPERATURE: 97.9 F | HEIGHT: 68 IN | WEIGHT: 184.8 LBS | DIASTOLIC BLOOD PRESSURE: 63 MMHG

## 2024-11-18 PROCEDURE — 25810000003 LACTATED RINGERS PER 1000 ML: Performed by: SURGERY

## 2024-11-18 PROCEDURE — 25010000002 GLUCAGON (RDNA) PER 1 MG: Performed by: SURGERY

## 2024-11-18 PROCEDURE — 25010000002 PROPOFOL 10 MG/ML EMULSION: Performed by: REGISTERED NURSE

## 2024-11-18 PROCEDURE — 25010000002 LIDOCAINE 2% SOLUTION: Performed by: REGISTERED NURSE

## 2024-11-18 PROCEDURE — 45378 DIAGNOSTIC COLONOSCOPY: CPT | Performed by: SURGERY

## 2024-11-18 RX ORDER — LIDOCAINE HYDROCHLORIDE 20 MG/ML
INJECTION, SOLUTION INFILTRATION; PERINEURAL AS NEEDED
Status: DISCONTINUED | OUTPATIENT
Start: 2024-11-18 | End: 2024-11-18 | Stop reason: SURG

## 2024-11-18 RX ORDER — PROPOFOL 10 MG/ML
VIAL (ML) INTRAVENOUS AS NEEDED
Status: DISCONTINUED | OUTPATIENT
Start: 2024-11-18 | End: 2024-11-18 | Stop reason: SURG

## 2024-11-18 RX ORDER — ONDANSETRON 2 MG/ML
4 INJECTION INTRAMUSCULAR; INTRAVENOUS ONCE AS NEEDED
Status: DISCONTINUED | OUTPATIENT
Start: 2024-11-18 | End: 2024-11-18 | Stop reason: HOSPADM

## 2024-11-18 RX ORDER — SODIUM CHLORIDE, SODIUM LACTATE, POTASSIUM CHLORIDE, CALCIUM CHLORIDE 600; 310; 30; 20 MG/100ML; MG/100ML; MG/100ML; MG/100ML
30 INJECTION, SOLUTION INTRAVENOUS CONTINUOUS PRN
Status: DISCONTINUED | OUTPATIENT
Start: 2024-11-18 | End: 2024-11-18 | Stop reason: HOSPADM

## 2024-11-18 RX ORDER — IBUPROFEN 600 MG/1
TABLET ORAL AS NEEDED
Status: DISCONTINUED | OUTPATIENT
Start: 2024-11-18 | End: 2024-11-18 | Stop reason: HOSPADM

## 2024-11-18 RX ADMIN — LIDOCAINE HYDROCHLORIDE 80 MG: 20 INJECTION, SOLUTION INFILTRATION; PERINEURAL at 08:05

## 2024-11-18 RX ADMIN — PROPOFOL 180 MCG/KG/MIN: 10 INJECTION, EMULSION INTRAVENOUS at 08:07

## 2024-11-18 RX ADMIN — SODIUM CHLORIDE, SODIUM LACTATE, POTASSIUM CHLORIDE, CALCIUM CHLORIDE 30 ML/HR: 20; 30; 600; 310 INJECTION, SOLUTION INTRAVENOUS at 07:25

## 2024-11-18 RX ADMIN — PROPOFOL 100 MG: 10 INJECTION, EMULSION INTRAVENOUS at 08:05

## 2024-11-18 RX ADMIN — PROPOFOL 70 MG: 10 INJECTION, EMULSION INTRAVENOUS at 08:07

## 2024-11-18 NOTE — ANESTHESIA PREPROCEDURE EVALUATION
Anesthesia Evaluation     Patient summary reviewed and Nursing notes reviewed                Airway   Mallampati: II  Dental      Pulmonary    (+) asthma,  Cardiovascular - negative cardio ROS    ECG reviewed  Rhythm: regular  Rate: normal        Neuro/Psych  (+) psychiatric history Anxiety  GI/Hepatic/Renal/Endo - negative ROS     Musculoskeletal (-) negative ROS    Abdominal    Substance History   (+) alcohol use     OB/GYN negative ob/gyn ROS         Other      history of cancer (breast)                Anesthesia Plan    ASA 2     MAC     intravenous induction     Anesthetic plan, risks, benefits, and alternatives have been provided, discussed and informed consent has been obtained with: patient.    CODE STATUS:

## 2024-11-18 NOTE — DISCHARGE INSTRUCTIONS
For the rest of the day patient needs to be with a responsible adult.    For the rest of the day DO NOT work, drive, operate heavy machinery, drink alcohol, make important decisions or sign legal documents.    Advance to regular diet as tolerated, if your stomach is upset start with a light/bland diet.    Follow recommendations on procedure report if provided by your doctor.    Call Dr Dillard for problems [phone]    If these problems occur come to ER: large amounts of bleeding, trouble breathing, repeated vomiting, severe unrelieved pain, fever or chills.

## 2024-11-18 NOTE — ANESTHESIA POSTPROCEDURE EVALUATION
Patient: Jessica Wood    Procedure Summary       Date: 11/18/24 Room / Location:  [facility]    Anesthesia Start: 0801 Anesthesia Stop: 0823    Procedure: COLONOSCOPY to cecum and terminal ileum Diagnosis:       Screen for colon cancer      (Screen for colon cancer [Z12.11])    Surgeons: Dede Dillard MD Provider: Antoine Thompson MD    Anesthesia Type: MAC ASA Status: 2            Anesthesia Type: MAC    Vitals  Vitals Value Taken Time   /63 11/18/24 0850   Temp     Pulse 62 11/18/24 0851   Resp 16 11/18/24 0850   SpO2 92 % 11/18/24 0851   Vitals shown include unfiled device data.        Post Anesthesia Care and Evaluation    Patient location during evaluation: PACU  Patient participation: complete - patient participated  Level of consciousness: awake and alert  Pain management: adequate    Airway patency: patent  Anesthetic complications: No anesthetic complications    Cardiovascular status: acceptable  Respiratory status: acceptable  Hydration status: acceptable    Comments: --------------------            11/18/24               0850     --------------------   BP:       111/63     Pulse:      65       Resp:       16       Temp:                SpO2:      98%      --------------------

## 2024-11-18 NOTE — H&P
Cc: Endoscopy Visit    HPI: 64 y.o. female here for screening with no family history of colon cancer and no prior polyps.     Past Medical History:   Diagnosis Date    Allergic     Anxiety     Asthma due to seasonal allergies     Breast cancer     LEFT BREAST. SURGERY    Cyst of joint of right hand     Hypothyroidism     COMPLETE THYROIDECTOMY BENIGN THYROID MASS    IBS (irritable bowel syndrome)     Seasonal allergies        Past Surgical History:   Procedure Laterality Date    BREAST BIOPSY Left     BREAST RECONSTRUCTION Bilateral 11/10/2021    Procedure: BILATERAL PLACEMENT OF TISSUE EXPANDERS AND ALLODERM;  Surgeon: Last Yoder MD;  Location: [facility] OR;  Service: Plastics;  Laterality: Bilateral;    BREAST TISSUE EXPANDER REMOVAL INSERTION OF IMPLANT Bilateral 2022    Procedure: BILATERAL REMOVAL OF TISSUE EXPANDERS AND PLACEMEMNT OF IMPLANTS;  Surgeon: Last Yoder MD;  Location: [facility] OR;  Service: Plastics;  Laterality: Bilateral;     SECTION N/A     COLONOSCOPY      CYST REMOVAL Right 2022    Procedure: EXCISION CYST;  Surgeon: Last Yoder MD;  Location: [facility] OR;  Service: Plastics;  Laterality: Right;    FAT GRAFTING Bilateral 2022    Procedure: FAT GRAFTING;  Surgeon: Last Yoder MD;  Location: [facility] OR;  Service: Plastics;  Laterality: Bilateral;    LYMPH NODE BIOPSY  11/10/21    MASTECTOMY W/ SENTINEL NODE BIOPSY Bilateral 11/10/2021    Procedure: bilateral MASTECTOMY WITH SENTINEL NODE BIOPSY;  Surgeon: Dede Dillard MD;  Location: [facility];  Service: General;  Laterality: Bilateral;    TOTAL THYROIDECTOMY         is allergic to sulfa antibiotics and latex.       Medication List        ASK your doctor about these medications      acetaminophen 325 MG tablet  Commonly known as: TYLENOL  Take 1 tablet by mouth Every 4 (Four) Hours As Needed for Mild Pain .     albuterol sulfate  (90  Base) MCG/ACT inhaler  Commonly known as: PROVENTIL HFA;VENTOLIN HFA;PROAIR HFA     azithromycin 250 MG tablet  Commonly known as: Zithromax Z-Khris  Take 2 tablets by mouth on day 1, then 1 tablet daily on days 2-5     escitalopram 20 MG tablet  Commonly known as: LEXAPRO     fexofenadine 180 MG tablet  Commonly known as: ALLEGRA     Fish Oil 1000 MG capsule delayed-release     folic acid 400 MCG tablet  Commonly known as: FOLVITE     loratadine 10 MG tablet  Commonly known as: CLARITIN     multivitamin with minerals tablet tablet     Olopatadine-Mometasone 665-25 MCG/ACT suspension     PROBIOTIC PO     Tirosint 137 MCG capsule  Generic drug: levothyroxine sodium     vitamin B-12 100 MCG tablet  Commonly known as: CYANOCOBALAMIN     Vitamin C 500 MG capsule     vitamin D3 125 MCG (5000 UT) capsule capsule              Family History   Problem Relation Age of Onset    Breast cancer Mother     Breast cancer Sister     Miscarriages / Stillbirths Sister     Malig Hyperthermia Neg Hx        Social History     Socioeconomic History    Marital status:    Tobacco Use    Smoking status: Never    Smokeless tobacco: Never   Vaping Use    Vaping status: Never Used   Substance and Sexual Activity    Alcohol use: Yes     Alcohol/week: 2.0 standard drinks of alcohol     Types: 2 Glasses of wine per week    Drug use: Never    Sexual activity: Yes     Partners: Male     Birth control/protection: Vasectomy       Vitals:    11/18/24 0700   BP: 138/76   Pulse: 80   Resp: 16   Temp: 97.9 °F (36.6 °C)   SpO2: 99%       Body mass index is 28.1 kg/m².      Physical Exam    General: No acute distress  Lungs: No labored breathing, Pulse oximetry on room air is 99%.  Heart/EKG: RRR  Abdomen: no complaints of pain  Mental:  Awake, alert, and oriented    Imp:     Screening, average risk.      Plan:  CARYN Dillard MD  08:03 EST

## 2024-11-18 NOTE — OP NOTE
Colonoscopy Procedure Note  Jessica Wood  1960  Date of Procedure: 11/18/24    Pre-operative Diagnosis:    Screening, average risk.     Post-operative Diagnosis:  normal    Procedure: Colonoscopy  with terminal ileoscopy       Recommendations:   Colonoscopy in 10 years.   Keep a copy of the photographs of the procedure given to you today for possible need for reference in the future.      Surgeon: Gilma    Anesthetic: MAC per Antoine Thompson MD    Scope Withdrawal Time:  6 minutes  13 seconds    Procedure Details     MAC anesthesia was induced.  The 180 Colonoscopy was inserted blindly into the rectum and advanced to the cecum, with relative ease,  without need for pressure, lift, or turning.    Cecum was identified by the appendiceal orifice and the ileocecal valve and photographed for documentation.  Terminal ileum was intubated and was normal.      Prep quality was very good.  A careful inspection was made as the scope was withdrawn, including a retroflexed view of the rectum; there was no suggestion of presence of angiodysplasias, colitis, polyps or diverticula, with no interventions.     Retroflexion in the rectum revealed no abnormalities.      Dede Dillard MD  11/18/24

## 2025-01-28 ENCOUNTER — OFFICE VISIT (OUTPATIENT)
Dept: ONCOLOGY | Facility: CLINIC | Age: 65
End: 2025-01-28
Payer: COMMERCIAL

## 2025-01-28 VITALS
SYSTOLIC BLOOD PRESSURE: 148 MMHG | RESPIRATION RATE: 16 BRPM | DIASTOLIC BLOOD PRESSURE: 88 MMHG | TEMPERATURE: 97.8 F | WEIGHT: 187.8 LBS | OXYGEN SATURATION: 96 % | HEIGHT: 68 IN | HEART RATE: 92 BPM | BODY MASS INDEX: 28.46 KG/M2

## 2025-01-28 DIAGNOSIS — C50.919 MALIGNANT NEOPLASM OF FEMALE BREAST, UNSPECIFIED ESTROGEN RECEPTOR STATUS, UNSPECIFIED LATERALITY, UNSPECIFIED SITE OF BREAST: Primary | ICD-10-CM

## 2025-01-28 RX ORDER — COLLAGEN, HYDROLYSATE (BOVINE) 100 %
POWDER (GRAM) MISCELLANEOUS
COMMUNITY

## 2025-01-28 NOTE — PROGRESS NOTES
Subjective   Jessica Wood is a 64 y.o. female.  Referred by Dr. Dillard for ductal carcinoma in situ    History of Present Illness   Ms. Wood is a 62-year-old postmenopausal  lady with a strong family history of breast cancer with her sister being diagnosed with breast cancer in her 30s and bilateral breast cancer, her mother with history of bilateral breast cancer.  Patient has been undergoing screening mammograms and MRIs every 6 monthly.  Her most recent mammogram in February 2021 this year was normal.  8/24/2021 MRI of the breast showed an area of non-mass enhancement measuring 1.3 x 0.4 x 1.5 cm in the left breast.  A biopsy of this was recommended.  Biopsy this area was consistent with ductal carcinoma in situ, high-grade,ER +4%, NY weakly positive, Ki-67 30%.  Due to strong family history patient opted to proceed with bilateral mastectomy and reconstruction.  She is status post bilateral mastectomy.  11/10/2021 pathology shows ductal carcinoma in situ measuring 10 x 1 x 1 mm in the left breast, ER positive for percent, NY +2% weak, Ki-67 30%.  Right breast benign.  3 sentinel lymph nodes on the left negative.  She is recovering well from surgery and undergoing reconstruction.  She is here to discuss adjuvant therapy.  Prior to the diagnosis of DCIS she was on Evista for risk reduction.  She had a normal bone density scan in 2016.  She has a 34-year-old daughter and she is concerned about the risk of breast cancer in her daughter.  She has 1 son.  No family history of ovarian, pancreatic, prostate cancer or melanomas.  Maternal grandmother with lung cancer but she was a smoker.    Interval history  Ms. Wood presents to the clinic today for follow-up.  She feels well without any new complaints at this time.  She denies palpating any new chest wall lesions.  She has lost a significant amount of weight in the past however gained some of it back and working towards losing the weight again.  She remains on  Synthroid and thyroid issues well-controlled at this time.  No other new complaints.  Her sister who had breast cancer twice in the past has recently palpated a mass in the left breast after having a bilateral mastectomy.  Patient remains worried about recurrence in her sister.    The following portions of the patient's history were reviewed and updated as appropriate: allergies, current medications, past family history, past medical history, past social history, past surgical history and problem list.    Past Medical History:   Diagnosis Date    Allergic     Anxiety     Asthma due to seasonal allergies     Breast cancer     LEFT BREAST. SURGERY    Cyst of joint of right hand     Hypothyroidism     COMPLETE THYROIDECTOMY BENIGN THYROID MASS    IBS (irritable bowel syndrome)     Seasonal allergies         Past Surgical History:   Procedure Laterality Date    BREAST BIOPSY Left     BREAST RECONSTRUCTION Bilateral 11/10/2021    Procedure: BILATERAL PLACEMENT OF TISSUE EXPANDERS AND ALLODERM;  Surgeon: Last Yoder MD;  Location: [facility];  Service: Plastics;  Laterality: Bilateral;    BREAST TISSUE EXPANDER REMOVAL INSERTION OF IMPLANT Bilateral 2022    Procedure: BILATERAL REMOVAL OF TISSUE EXPANDERS AND PLACEMEMNT OF IMPLANTS;  Surgeon: Last Yoder MD;  Location: [facility] OR;  Service: Plastics;  Laterality: Bilateral;     SECTION N/A     COLONOSCOPY      COLONOSCOPY N/A 2024    Procedure: COLONOSCOPY to cecum and terminal ileum;  Surgeon: Dede Dillard MD;  Location: [facility];  Service: General;  Laterality: N/A;  Pre: Screening  Post: normal colon    CYST REMOVAL Right 2022    Procedure: EXCISION CYST;  Surgeon: Last Yoder MD;  Location: [facility] OR;  Service: Plastics;  Laterality: Right;    FAT GRAFTING Bilateral 2022    Procedure: FAT GRAFTING;  Surgeon: Last Yoder MD;  Location: [facility] OR;   Service: Plastics;  Laterality: Bilateral;    LYMPH NODE BIOPSY  11/10/21    MASTECTOMY W/ SENTINEL NODE BIOPSY Bilateral 11/10/2021    Procedure: bilateral MASTECTOMY WITH SENTINEL NODE BIOPSY;  Surgeon: Dede Dillard MD;  Location: [facility];  Service: General;  Laterality: Bilateral;    TOTAL THYROIDECTOMY          Family History   Problem Relation Age of Onset    Breast cancer Mother     Breast cancer Sister     Miscarriages / Stillbirths Sister     Malig Hyperthermia Neg Hx         Social History     Socioeconomic History    Marital status:    Tobacco Use    Smoking status: Never    Smokeless tobacco: Never   Vaping Use    Vaping status: Never Used   Substance and Sexual Activity    Alcohol use: Yes     Alcohol/week: 2.0 standard drinks of alcohol     Types: 2 Glasses of wine per week    Drug use: Never    Sexual activity: Yes     Partners: Male     Birth control/protection: Vasectomy        OB History          2    Para   2    Term   2            AB        Living             SAB        IAB        Ectopic        Molar        Multiple        Live Births                 Age of menarche-14  Age at first live childbirth-26   2 para 2  0  Age at menopause-54  No use of hormone replacement therapy oral contraceptive pills  She had infertility issues for which she had to use Clomid.    Allergies   Allergen Reactions    Sulfa Antibiotics Hives     Hives, itching, swelling    Latex Hives     Hives and itching            Review of Systems   Constitutional: Negative.    HENT: Negative.     Eyes: Negative.    Respiratory: Negative.     Cardiovascular: Negative.    Gastrointestinal: Negative.    Genitourinary:  Positive for amenorrhea.   Musculoskeletal: Negative.    Allergic/Immunologic: Negative.    Neurological: Negative.    Hematological: Negative.    Psychiatric/Behavioral: Negative.       Review of systems as mentioned in the HPI otherwise negative    Objective   Blood  "pressure 148/88, pulse 92, temperature 97.8 °F (36.6 °C), temperature source Oral, resp. rate 16, height 172 cm (67.72\"), weight 85.2 kg (187 lb 12.8 oz), SpO2 96%, not currently breastfeeding.   Physical Exam  Vitals reviewed.   Constitutional:       Appearance: Normal appearance.   HENT:      Head: Normocephalic and atraumatic.      Right Ear: External ear normal.      Left Ear: External ear normal.      Nose: Nose normal.      Mouth/Throat:      Mouth: Mucous membranes are moist.   Eyes:      Conjunctiva/sclera: Conjunctivae normal.      Pupils: Pupils are equal, round, and reactive to light.   Cardiovascular:      Rate and Rhythm: Normal rate and regular rhythm.   Pulmonary:      Effort: Pulmonary effort is normal.   Abdominal:      General: Abdomen is flat.   Musculoskeletal:         General: Normal range of motion.      Cervical back: Normal range of motion.   Skin:     General: Skin is warm.   Neurological:      General: No focal deficit present.      Mental Status: She is alert and oriented to person, place, and time.   Psychiatric:         Mood and Affect: Mood normal.         Behavior: Behavior normal.         Thought Content: Thought content normal.         Judgment: Judgment normal.       Breast Exam: Status post bilateral mastectomy with reconstruction  No new chest wall nodules.    I have reexamined the patient and the results are consistent with the previously documented exam. Sandrine Landaverde MD      No visits with results within 30 Day(s) from this visit.   Latest known visit with results is:   Lab on 01/05/2023   Component Date Value Ref Range Status    Miscellaneous Lab Test Result 01/05/2023 See attached report   Final        No radiology results for the last 30 days.         Assessment & Plan       *Left breast DCIS  High-grade, weakly ER/OR positive/almost, receptor negative  She is status post bilateral mastectomy  Explained to her regarding the pathology and the fact that with DCIS there is " risk of local recurrence or new invasive breast cancer ipsilaterally and contralaterally but now that she has had a bilateral mastectomy this would not be risk anymore.  Therefore she does not need any adjuvant endocrine therapy.  No evidence of recurrent disease  She is 4 years out from the diagnosis of DCIS.  Continue annual exams of the chest wall    *Family history of breast cancer  9 gene stat panel with [facility] performed and negative    *Follow-up-1 year

## 2025-03-31 ENCOUNTER — OFFICE VISIT (OUTPATIENT)
Dept: FAMILY MEDICINE CLINIC | Facility: CLINIC | Age: 65
End: 2025-03-31
Payer: COMMERCIAL

## 2025-03-31 VITALS
SYSTOLIC BLOOD PRESSURE: 138 MMHG | DIASTOLIC BLOOD PRESSURE: 80 MMHG | RESPIRATION RATE: 16 BRPM | WEIGHT: 188 LBS | HEIGHT: 68 IN | OXYGEN SATURATION: 97 % | HEART RATE: 102 BPM | BODY MASS INDEX: 28.49 KG/M2

## 2025-03-31 DIAGNOSIS — E66.3 OVERWEIGHT (BMI 25.0-29.9): Primary | ICD-10-CM

## 2025-03-31 DIAGNOSIS — R03.0 ELEVATED BLOOD PRESSURE READING WITHOUT DIAGNOSIS OF HYPERTENSION: ICD-10-CM

## 2025-03-31 RX ORDER — SEMAGLUTIDE 0.25 MG/.5ML
0.25 INJECTION, SOLUTION SUBCUTANEOUS WEEKLY
Qty: 6 ML | Refills: 1 | Status: SHIPPED | OUTPATIENT
Start: 2025-03-31

## 2025-03-31 NOTE — PROGRESS NOTES
Subjective   Jessica Wood is a 64 y.o. female. Patient is here today for   Chief Complaint   Patient presents with    weight loss issues           Vitals:    03/31/25 1334   BP: 138/80   Pulse: 102   Resp: 16   SpO2: 97%     Body mass index is 28.82 kg/m².    The following portions of the patient's history were reviewed and updated as appropriate: allergies, current medications, past family history, past medical history, past social history, past surgical history and problem list.    Past Medical History:   Diagnosis Date    Allergic     Anxiety     Asthma due to seasonal allergies     Breast cancer 2021    LEFT BREAST. SURGERY    Cyst of joint of right hand     Hypothyroidism     COMPLETE THYROIDECTOMY BENIGN THYROID MASS    IBS (irritable bowel syndrome)     Seasonal allergies       Allergies   Allergen Reactions    Sulfa Antibiotics Hives     Hives, itching, swelling    Latex Hives     Hives and itching      Social History     Socioeconomic History    Marital status:    Tobacco Use    Smoking status: Never    Smokeless tobacco: Never   Vaping Use    Vaping status: Never Used   Substance and Sexual Activity    Alcohol use: Yes     Alcohol/week: 2.0 standard drinks of alcohol     Types: 2 Glasses of wine per week    Drug use: Never    Sexual activity: Yes     Partners: Male     Birth control/protection: Vasectomy        Current Outpatient Medications:     Ascorbic Acid (Vitamin C) 500 MG capsule, Take 500 mg by mouth Daily., Disp: , Rfl:     Collagen Hydrolysate powder, Use., Disp: , Rfl:     escitalopram (LEXAPRO) 20 MG tablet, Take 1 tablet by mouth Daily., Disp: , Rfl:     folic acid (FOLVITE) 400 MCG tablet, Take 1 tablet by mouth Daily., Disp: , Rfl:     loratadine (CLARITIN) 10 MG tablet, Take 1 tablet by mouth Daily., Disp: , Rfl:     multivitamin with minerals (MULTIVITAMIN ADULT PO), Take 1 tablet by mouth Daily., Disp: , Rfl:     Olopatadine-Mometasone 665-25 MCG/ACT suspension, Administer 2 puffs  into the nostril(s) as directed by provider 2 (Two) Times a Day., Disp: , Rfl:     Omega-3 Fatty Acids (Fish Oil) 1000 MG capsule delayed-release, Take 1,200 mg by mouth Daily., Disp: , Rfl:     Probiotic Product (PROBIOTIC PO), Take 30 mg by mouth Daily., Disp: , Rfl:     Semaglutide-Weight Management (Wegovy) 0.25 MG/0.5ML solution auto-injector, Inject 0.5 mL under the skin into the appropriate area as directed 1 (One) Time Per Week. Inject 0.25 mg subcutaneously weekly x 4 doses then increase to 0.5 mg subcutaneous weekly x 4 doses, Disp: 6 mL, Rfl: 1    Tirosint 137 MCG capsule, Take 1 capsule by mouth Every Night., Disp: , Rfl:     vitamin B-12 (CYANOCOBALAMIN) 100 MCG tablet, Take 10 tablets by mouth Daily., Disp: , Rfl:     vitamin D3 125 MCG (5000 UT) capsule capsule, Take 1 capsule by mouth Daily., Disp: , Rfl:      Objective     History of Present Illness   History of Present Illness  The patient is a 64-year-old female who presents to discuss weight loss.    She has been experiencing weight fluctuations throughout her life. Approximately 1.5 years ago, she achieved a weight of 140 pounds through the [employer]ystem diet but has since regained the weight. Despite daily gym workouts and frequent walking, she has not been able to maintain her weight loss. She expresses concern about potential obesity and is considering Wegovy as a treatment option. She has previously used a pedometer but has not done so recently.    She also reports elevated blood pressure readings during doctor visits, which she attributes to anxiety. She does not monitor her blood pressure at home due to this anxiety. Her blood pressure readings have been normal during visits to her OB-GYN and allergist.    Supplemental Information  She has a history of hip issues and arm pain, the latter of which is associated with lymph node removal. She is cautious about overexertion due to the risk of lymphedema. She underwent a colonoscopy in 11/2024.  She has a history of thyroidectomy and undergoes annual thyroid function tests.    SOCIAL HISTORY  She is currently unemployed and was previously employed as a .    FAMILY HISTORY  Her mother and father both had breast cancer.    Review of Systems   Constitutional:  Negative for activity change and unexpected weight change.   Respiratory: Negative.     Cardiovascular: Negative.    Gastrointestinal: Negative.    Psychiatric/Behavioral: Negative.         Physical Exam  Vitals reviewed.   Constitutional:       Appearance: Normal appearance.   Cardiovascular:      Rate and Rhythm: Normal rate and regular rhythm.      Heart sounds: Normal heart sounds.      Comments: 138/80  Neurological:      Mental Status: She is alert.   Psychiatric:         Mood and Affect: Mood normal.         Behavior: Behavior normal.         Thought Content: Thought content normal.         Judgment: Judgment normal.       Physical Exam  Vital Signs  Patient's weight is 188. BMI is 28.8 with clothes on.    Results  Laboratory Studies  Thyroid tests were normal in October.       Assessment    ASSESSMENT    Problems Addressed this Visit          Cardiac and Vasculature    Elevated blood pressure reading without diagnosis of hypertension       Endocrine and Metabolic    Overweight (BMI 25.0-29.9) - Primary     Diagnoses         Codes Comments      Overweight (BMI 25.0-29.9)    -  Primary ICD-10-CM: E66.3  ICD-9-CM: 278.02       Elevated blood pressure reading without diagnosis of hypertension     ICD-10-CM: R03.0  ICD-9-CM: 796.2           Assessment & Plan  1. Weight management.  Her body mass index (BMI) is currently 28.8, indicating a need for weight reduction of approximately 20 pounds to reach a normal BMI of 25 or less. The potential use of Wegovy was discussed, including its cost and the fact that insurance typically does not cover it. The risks associated with Wegovy, such as nausea and diarrhea, were also discussed. TALIB  prescription for Wegovy was provided, starting with a dosage of 0.25 mg subcutaneously weekly for 4 doses, then increasing to 0.5 mg subcutaneously weekly for 4 doses. She was advised to maintain a net deficit of 500 calories per day to achieve a weight loss of 1 pound per week.    2. Elevated blood pressure.  Her last two blood pressure readings were elevated, with today's reading at 138 systolic. She was advised to monitor her blood pressure at home and ensure it remains below 120/80. Instructions were given on how to properly measure blood pressure at home, including sitting and relaxing for a few minutes before taking the measurement.    Follow-up  The patient will follow up in 2 months for an annual physical examination.    PROCEDURE  The patient underwent a colonoscopy in 11/2024.    PLAN  There are no Patient Instructions on file for this visit.  Return in about 2 months (around 5/31/2025) for Annual physical, cmp, lipid, cbc, a1c.    Patient or patient representative verbalized consent for the use of Ambient Listening during the visit with  Tyler Venegas MD for chart documentation. 3/31/2025  15:50 EDT

## 2025-04-02 ENCOUNTER — TELEPHONE (OUTPATIENT)
Dept: FAMILY MEDICINE CLINIC | Facility: CLINIC | Age: 65
End: 2025-04-02
Payer: COMMERCIAL

## 2025-04-02 NOTE — TELEPHONE ENCOUNTER
Caller: Jessica Wood    Relationship to patient: Self      Best call back number: [phone]     Provider: GER LEONARD    Medication PA needed: WEGOVY    Reason for call/Prior Auth: PATIENT STATED HER PHARMACY STATED SHE CANNOT  THIS MEDICATION AS THEY NEED AUTHORIZATION FROM DR LEONARD FOR INSURANCE.    PATIENT STATED THEY ADVISED THEY CANNOT LET HER PAY OUT OF POCKET WITH A COUPON WITHOUT THIS AUTHORIZATION EITHER.    PATIENT IS REQUESTING THIS BE COMPLETED SO SHE MAY START TAKING THIS MEDICATION.    PHARMACY: [facility] & [address] - [phone]  - [phone]       PLEASE INFORM PATIENT WHEN THIS REQUEST IS COMPLETED    CHEO RENNER PER PATIENT.

## 2025-04-02 NOTE — TELEPHONE ENCOUNTER
PATIENT STATES THAT THE INSURANCE DOES COVER THIS WITH AUTHORIZATION AND WILL WAIT TO PICK IT UP UNTIL IT HAS BEEN AUTHORIZED.

## 2025-04-25 ENCOUNTER — TELEPHONE (OUTPATIENT)
Dept: FAMILY MEDICINE CLINIC | Facility: CLINIC | Age: 65
End: 2025-04-25
Payer: COMMERCIAL

## 2025-04-25 NOTE — TELEPHONE ENCOUNTER
Caller: Jessica Wood    Relationship: Self    Best call back number: [phone]     What medication are you requesting: WEGOVY 0.5 STRENGTH.    If a prescription is needed, what is your preferred pharmacy and phone number: [facility] - [place], [place] - [address] - [phone]  - [phone] FX     Additional notes: PATIENT STATED IN ABOUT A WEEK TO TWO WEEKS SHE IS DUE FOR HER INCREASE IN DOSE FROM 0.25 TO 0.5 STRENGTH OF THIS MEDICATION.    PATIENT STATED HER PHARMACY ADVISED SHE WILL NEED A NEW PRESCRIPTION SENT BY HER DOCTOR FOR THIS.    PLEASE CALL TO INFORM PATIENT WHEN THIS IS COMPLETED.

## 2025-04-25 NOTE — TELEPHONE ENCOUNTER
Rx Refill Note  Requested Prescriptions     Pending Prescriptions Disp Refills    Semaglutide-Weight Management 0.5 MG/0.5ML solution auto-injector 2 mL 0     Sig: Inject 0.5 mL under the skin into the appropriate area as directed 1 (One) Time Per Week.      Last office visit with prescribing clinician: 3/31/2025   Last telemedicine visit with prescribing clinician: Visit date not found   Next office visit with prescribing clinician: 5/27/2025                         Would you like a call back once the refill request has been completed: [] Yes [] No    If the office needs to give you a call back, can they leave a voicemail: [] Yes [] No    Katheryn Hernadez MA  04/25/25, 12:45 EDT

## 2025-05-21 DIAGNOSIS — Z11.59 NEED FOR HEPATITIS C SCREENING TEST: ICD-10-CM

## 2025-05-21 DIAGNOSIS — E03.9 ACQUIRED HYPOTHYROIDISM: Primary | ICD-10-CM

## 2025-05-21 DIAGNOSIS — R73.01 ELEVATED FASTING BLOOD SUGAR: ICD-10-CM

## 2025-05-21 DIAGNOSIS — Z00.00 ANNUAL PHYSICAL EXAM: ICD-10-CM

## 2025-05-23 LAB
ALBUMIN SERPL-MCNC: 4.5 G/DL (ref 3.9–4.9)
ALP SERPL-CCNC: 92 IU/L (ref 44–121)
ALT SERPL-CCNC: 12 IU/L (ref 0–32)
AST SERPL-CCNC: 22 IU/L (ref 0–40)
BASOPHILS # BLD AUTO: 0 X10E3/UL (ref 0–0.2)
BASOPHILS NFR BLD AUTO: 1 %
BILIRUB SERPL-MCNC: 0.6 MG/DL (ref 0–1.2)
BUN SERPL-MCNC: 9 MG/DL (ref 8–27)
BUN/CREAT SERPL: 10 (ref 12–28)
CALCIUM SERPL-MCNC: 9.5 MG/DL (ref 8.7–10.3)
CHLORIDE SERPL-SCNC: 103 MMOL/L (ref 96–106)
CHOLEST SERPL-MCNC: 170 MG/DL (ref 100–199)
CO2 SERPL-SCNC: 23 MMOL/L (ref 20–29)
CREAT SERPL-MCNC: 0.89 MG/DL (ref 0.57–1)
EGFRCR SERPLBLD CKD-EPI 2021: 73 ML/MIN/1.73
EOSINOPHIL # BLD AUTO: 0.1 X10E3/UL (ref 0–0.4)
EOSINOPHIL NFR BLD AUTO: 2 %
ERYTHROCYTE [DISTWIDTH] IN BLOOD BY AUTOMATED COUNT: 12 % (ref 11.7–15.4)
GLOBULIN SER CALC-MCNC: 2.6 G/DL (ref 1.5–4.5)
GLUCOSE SERPL-MCNC: 88 MG/DL (ref 70–99)
HBA1C MFR BLD: 5.1 % (ref 4.8–5.6)
HCT VFR BLD AUTO: 45 % (ref 34–46.6)
HCV IGG SERPL QL IA: NON REACTIVE
HDLC SERPL-MCNC: 87 MG/DL
HGB BLD-MCNC: 14.3 G/DL (ref 11.1–15.9)
IMM GRANULOCYTES # BLD AUTO: 0 X10E3/UL (ref 0–0.1)
IMM GRANULOCYTES NFR BLD AUTO: 0 %
LDLC SERPL CALC-MCNC: 69 MG/DL (ref 0–99)
LDLC/HDLC SERPL: 0.8 RATIO (ref 0–3.2)
LYMPHOCYTES # BLD AUTO: 1.8 X10E3/UL (ref 0.7–3.1)
LYMPHOCYTES NFR BLD AUTO: 37 %
MCH RBC QN AUTO: 30.2 PG (ref 26.6–33)
MCHC RBC AUTO-ENTMCNC: 31.8 G/DL (ref 31.5–35.7)
MCV RBC AUTO: 95 FL (ref 79–97)
MONOCYTES # BLD AUTO: 0.5 X10E3/UL (ref 0.1–0.9)
MONOCYTES NFR BLD AUTO: 10 %
NEUTROPHILS # BLD AUTO: 2.5 X10E3/UL (ref 1.4–7)
NEUTROPHILS NFR BLD AUTO: 50 %
PLATELET # BLD AUTO: 224 X10E3/UL (ref 150–450)
POTASSIUM SERPL-SCNC: 4.2 MMOL/L (ref 3.5–5.2)
PROT SERPL-MCNC: 7.1 G/DL (ref 6–8.5)
RBC # BLD AUTO: 4.73 X10E6/UL (ref 3.77–5.28)
SODIUM SERPL-SCNC: 139 MMOL/L (ref 134–144)
T4 FREE SERPL-MCNC: 1.66 NG/DL (ref 0.82–1.77)
TRIGL SERPL-MCNC: 73 MG/DL (ref 0–149)
TSH SERPL DL<=0.005 MIU/L-ACNC: 0.2 UIU/ML (ref 0.45–4.5)
UNABLE TO VOID: NORMAL
VLDLC SERPL CALC-MCNC: 14 MG/DL (ref 5–40)
WBC # BLD AUTO: 4.9 X10E3/UL (ref 3.4–10.8)

## 2025-05-27 ENCOUNTER — OFFICE VISIT (OUTPATIENT)
Dept: FAMILY MEDICINE CLINIC | Facility: CLINIC | Age: 65
End: 2025-05-27
Payer: COMMERCIAL

## 2025-05-27 VITALS
OXYGEN SATURATION: 95 % | HEIGHT: 68 IN | HEART RATE: 95 BPM | SYSTOLIC BLOOD PRESSURE: 118 MMHG | BODY MASS INDEX: 26.67 KG/M2 | DIASTOLIC BLOOD PRESSURE: 70 MMHG | WEIGHT: 176 LBS | RESPIRATION RATE: 16 BRPM

## 2025-05-27 DIAGNOSIS — Z23 NEED FOR DIPHTHERIA-TETANUS-PERTUSSIS (TDAP) VACCINE: ICD-10-CM

## 2025-05-27 DIAGNOSIS — E03.9 ACQUIRED HYPOTHYROIDISM: Primary | ICD-10-CM

## 2025-05-27 DIAGNOSIS — Z00.00 ANNUAL PHYSICAL EXAM: ICD-10-CM

## 2025-05-27 DIAGNOSIS — F41.9 ANXIETY: ICD-10-CM

## 2025-05-27 PROBLEM — R03.0 ELEVATED BLOOD PRESSURE READING WITHOUT DIAGNOSIS OF HYPERTENSION: Status: RESOLVED | Noted: 2025-03-31 | Resolved: 2025-05-27

## 2025-05-27 NOTE — PROGRESS NOTES
Wilson Wood is a 64 y.o. female. Patient is here today for   Chief Complaint   Patient presents with    Annual Exam    Hypothyroidism          Vitals:    05/27/25 1305   BP: 118/70   Pulse: 95   Resp: 16   SpO2: 95%     Body mass index is 26.98 kg/m².    The following portions of the patient's history were reviewed and updated as appropriate: allergies, current medications, past family history, past medical history, past social history, past surgical history and problem list.    Past Medical History:   Diagnosis Date    Allergic     Anxiety     Asthma due to seasonal allergies     Breast cancer 2021    LEFT BREAST. SURGERY    Cyst of joint of right hand     Hypothyroidism     COMPLETE THYROIDECTOMY BENIGN THYROID MASS    IBS (irritable bowel syndrome)     Seasonal allergies       Allergies   Allergen Reactions    Sulfa Antibiotics Hives     Hives, itching, swelling    Latex Hives     Hives and itching      Social History     Socioeconomic History    Marital status:    Tobacco Use    Smoking status: Never    Smokeless tobacco: Never   Vaping Use    Vaping status: Never Used   Substance and Sexual Activity    Alcohol use: Yes     Alcohol/week: 2.0 standard drinks of alcohol     Types: 2 Glasses of wine per week    Drug use: Never    Sexual activity: Yes     Partners: Male     Birth control/protection: Vasectomy        Current Outpatient Medications:     Ascorbic Acid (Vitamin C) 500 MG capsule, Take 500 mg by mouth Daily., Disp: , Rfl:     Collagen Hydrolysate powder, Use., Disp: , Rfl:     escitalopram (LEXAPRO) 20 MG tablet, Take 1 tablet by mouth Daily., Disp: , Rfl:     folic acid (FOLVITE) 400 MCG tablet, Take 1 tablet by mouth Daily., Disp: , Rfl:     loratadine (CLARITIN) 10 MG tablet, Take 1 tablet by mouth Daily., Disp: , Rfl:     multivitamin with minerals (MULTIVITAMIN ADULT PO), Take 1 tablet by mouth Daily., Disp: , Rfl:     Olopatadine-Mometasone 665-25 MCG/ACT suspension, Administer  2 puffs into the nostril(s) as directed by provider 2 (Two) Times a Day., Disp: , Rfl:     Omega-3 Fatty Acids (Fish Oil) 1000 MG capsule delayed-release, Take 1,200 mg by mouth Daily., Disp: , Rfl:     Probiotic Product (PROBIOTIC PO), Take 30 mg by mouth Daily., Disp: , Rfl:     Semaglutide-Weight Management 0.5 MG/0.5ML solution auto-injector, Inject 0.5 mL under the skin into the appropriate area as directed 1 (One) Time Per Week., Disp: 2 mL, Rfl: 0    Tirosint 137 MCG capsule, Take 1 capsule by mouth Every Night., Disp: , Rfl:     vitamin B-12 (CYANOCOBALAMIN) 100 MCG tablet, Take 10 tablets by mouth Daily., Disp: , Rfl:     vitamin D3 125 MCG (5000 UT) capsule capsule, Take 1 capsule by mouth Daily., Disp: , Rfl:      EKG not done    Objective   Hypothyroidism       History of Present Illness  The patient is a 63-year-old female here for an annual physical examination.    She has been on Wegovy, with the most recent dose administered yesterday. She reports a weight loss from 188 to 176 pounds, attributing it to the medication and dietary changes. Initially, she did not perceive any appetite suppression, but in recent weeks, she has noticed a significant reduction in her food intake. She has been mindful of her diet, avoiding overeating and limiting her portion sizes. She does not weigh herself at home due to previous disappointments. She experiences intermittent nausea after consuming water but overall feels well. She has incorporated physical activity into her routine, including walking 4 miles yesterday. Her diet includes salmon as a primary protein source. She has been experiencing difficulty obtaining her second prescription of Wegovy.    She undergoes annual eye examinations, with the most recent one conducted last year by Dr. Hathaway in [place]. She is currently under the care of a physician's assistant for gynecological health, including Pap smears every 3 years. She maintains good oral hygiene,  brushing her teeth daily, and had a dental check-up approximately a week after starting Wegovy. She also undergoes annual dermatological examinations and consistently uses sunblock with an SPF of 50. She spends considerable time outdoors, including daily walks and boating activities. She has not received the shingles vaccine and is uncertain about her Tdap vaccination status. She has received the influenza vaccine.    She has been monitoring her blood pressure at home, which has remained within normal limits.    She had a bone density test recently and is due for another one in the fall. She was diagnosed with osteopenia, but her condition has slightly improved compared to previous assessments. She takes vitamin D3 5000 IU daily.    She had an elevated liver function test in 2021, which was attributed to increased alcohol consumption during the Christmas season. She had an ultrasound of her liver 10 years ago, which was normal.    FAMILY HISTORY  Her father passed away from nonalcoholic cirrhosis of the liver at the age of 89. He also had a valve replacement and was on blood thinners but did not have coronary artery disease.       Review of Systems   Constitutional:  Positive for activity change.        12 lb weight loss   Eyes:         Recent exam   Respiratory: Negative.     Cardiovascular: Negative.    Gastrointestinal: Negative.    Genitourinary: Negative.    Neurological: Negative.    Psychiatric/Behavioral: Negative.         Physical Exam  Vitals reviewed.   Constitutional:       Appearance: Normal appearance.   HENT:      Right Ear: Tympanic membrane normal.      Left Ear: Tympanic membrane normal.      Mouth/Throat:      Mouth: Mucous membranes are moist.      Pharynx: Oropharynx is clear.   Eyes:      Extraocular Movements: Extraocular movements intact.      Pupils: Pupils are equal, round, and reactive to light.   Neck:      Vascular: No carotid bruit.   Cardiovascular:      Rate and Rhythm: Normal rate  and regular rhythm.      Pulses: Normal pulses.      Heart sounds: Normal heart sounds.   Pulmonary:      Effort: Pulmonary effort is normal.      Breath sounds: Normal breath sounds.   Musculoskeletal:      Right lower leg: No edema.      Left lower leg: No edema.   Lymphadenopathy:      Cervical: No cervical adenopathy.   Neurological:      Mental Status: She is alert.   Psychiatric:         Mood and Affect: Mood normal.         Behavior: Behavior normal.         Thought Content: Thought content normal.         Judgment: Judgment normal.       Physical Exam      Results  Labs   - Total cholesterol: 170   - Triglycerides: 73   - HDL cholesterol: 87   - LDL cholesterol: 69   - Blood sugar: 88   - A1c: 5.1   - TSH:  suppressed   - Free T4: 1.66   - White blood cell count: Normal   - Platelet count: Normal      Assessment   ASSESSMENT    Problems Addressed this Visit          Endocrine and Metabolic    Acquired hypothyroidism - Primary       Health Encounters    Annual physical exam       Mental Health    Anxiety     Other Visit Diagnoses         Need for diphtheria-tetanus-pertussis (Tdap) vaccine        Relevant Orders    Tdap Vaccine Greater Than or Equal To 8yo IM (Completed)          Diagnoses         Codes Comments      Acquired hypothyroidism    -  Primary ICD-10-CM: E03.9  ICD-9-CM: 244.9       Anxiety     ICD-10-CM: F41.9  ICD-9-CM: 300.00       Annual physical exam     ICD-10-CM: Z00.00  ICD-9-CM: V70.0       Need for diphtheria-tetanus-pertussis (Tdap) vaccine     ICD-10-CM: Z23  ICD-9-CM: V06.1           Assessment & Plan  1. Health maintenance.  - Blood pressure readings have been consistently elevated during the last two visits, with today's reading at 118/70.  - Comprehensive metabolic panel results are within normal limits, indicating no anemia or other abnormalities. TSH levels are slightly suppressed, but free T4 levels remain within the normal range.  - Advised to maintain a diet rich in proteins,  low in fats, and moderate in carbohydrates.  - Recommended to receive the shingles vaccine, which is a series of two injections, and the Tdap vaccine today. Advised to receive the pneumococcal vaccine at the age of 65.    2. Weight management.  - Lost 12 pounds since starting Wegovy.  - Reports some nausea but overall feels good and is satisfied with the weight loss progress.  - Advised to continue monitoring diet and to incorporate regular physical activity, such as walking, into routine.  - Prescription for Wegovy has been managed with some difficulty in obtaining the medication.    3. Osteopenia.  - Due for a follow-up bone density scan in the fall.  - Advised to continue taking vitamin D3 5000 IU daily.  - Previous bone density scan showed slight decrease compared to past results.    4. Elevated blood pressure.  - Blood pressure was high during the last two visits (148/88 and 138/80) but is now 118/70.  - Monitoring blood pressure at home, which has been normal.  - Emphasized the importance of maintaining normal blood pressure to prevent cardiovascular diseases.    5.  Hypothyroidism.  - Managed by endocrinology.    PLAN  There are no Patient Instructions on file for this visit.    Return in about 1 year (around 5/27/2026) for Annual physical.    Patient or patient representative verbalized consent for the use of Ambient Listening during the visit with  Tyler Venegas MD for chart documentation. 5/27/2025  14:06 EDT

## 2025-06-03 ENCOUNTER — OFFICE VISIT (OUTPATIENT)
Dept: FAMILY MEDICINE CLINIC | Facility: CLINIC | Age: 65
End: 2025-06-03
Payer: COMMERCIAL

## 2025-06-03 ENCOUNTER — TELEPHONE (OUTPATIENT)
Dept: FAMILY MEDICINE CLINIC | Facility: CLINIC | Age: 65
End: 2025-06-03

## 2025-06-03 VITALS
HEART RATE: 94 BPM | BODY MASS INDEX: 26.22 KG/M2 | OXYGEN SATURATION: 96 % | DIASTOLIC BLOOD PRESSURE: 80 MMHG | HEIGHT: 68 IN | WEIGHT: 173 LBS | RESPIRATION RATE: 16 BRPM | SYSTOLIC BLOOD PRESSURE: 134 MMHG

## 2025-06-03 DIAGNOSIS — R06.02 SHORTNESS OF BREATH: Primary | ICD-10-CM

## 2025-06-03 PROCEDURE — 99214 OFFICE O/P EST MOD 30 MIN: CPT | Performed by: INTERNAL MEDICINE

## 2025-06-03 RX ORDER — BUDESONIDE AND FORMOTEROL FUMARATE DIHYDRATE 160; 4.5 UG/1; UG/1
2 AEROSOL RESPIRATORY (INHALATION)
Qty: 10.2 G | Refills: 12 | Status: SHIPPED | OUTPATIENT
Start: 2025-06-03

## 2025-06-03 RX ORDER — ALBUTEROL SULFATE AND BUDESONIDE 90; 80 UG/1; UG/1
2 AEROSOL, METERED RESPIRATORY (INHALATION) 4 TIMES DAILY PRN
Qty: 10.7 G | Refills: 3 | Status: SHIPPED | OUTPATIENT
Start: 2025-06-03

## 2025-06-03 RX ORDER — SEMAGLUTIDE 1 MG/.5ML
1 INJECTION, SOLUTION SUBCUTANEOUS WEEKLY
Qty: 2 ML | Refills: 1 | Status: SHIPPED | OUTPATIENT
Start: 2025-06-03

## 2025-06-03 NOTE — TELEPHONE ENCOUNTER
Caller: Jessica Wood    Relationship to patient: Self    Best call back number: [phone]      Patient is needing: NEEDS PRIOR AUTH ON budesonide-formoterol (Symbicort) 160-4.5 MCG/ACT inhaler

## 2025-06-03 NOTE — PROGRESS NOTES
Subjective   Jessica Wood is a 64 y.o. female. Patient is here today for   Chief Complaint   Patient presents with    Shortness of Breath    Cough    Asthma          Vitals:    06/03/25 1039   BP: 134/80   Pulse: 94   Resp: 16   SpO2: 96%     Body mass index is 26.52 kg/m².    The following portions of the patient's history were reviewed and updated as appropriate: allergies, current medications, past family history, past medical history, past social history, past surgical history and problem list.    Past Medical History:   Diagnosis Date    Allergic     Anxiety     Asthma due to seasonal allergies     Breast cancer 2021    LEFT BREAST. SURGERY    Cyst of joint of right hand     Hypothyroidism     COMPLETE THYROIDECTOMY BENIGN THYROID MASS    IBS (irritable bowel syndrome)     Seasonal allergies       Allergies   Allergen Reactions    Sulfa Antibiotics Hives     Hives, itching, swelling    Latex Hives     Hives and itching      Social History     Socioeconomic History    Marital status:    Tobacco Use    Smoking status: Never    Smokeless tobacco: Never   Vaping Use    Vaping status: Never Used   Substance and Sexual Activity    Alcohol use: Yes     Alcohol/week: 2.0 standard drinks of alcohol     Types: 2 Glasses of wine per week    Drug use: Never    Sexual activity: Yes     Partners: Male     Birth control/protection: Vasectomy        Current Outpatient Medications:     Albuterol-Budesonide (Airsupra) 90-80 MCG/ACT aerosol, Inhale 2 Inhalations 4 (Four) Times a Day As Needed (Shortness of breath or wheezing)., Disp: 10.7 g, Rfl: 3    Ascorbic Acid (Vitamin C) 500 MG capsule, Take 500 mg by mouth Daily., Disp: , Rfl:     budesonide-formoterol (Symbicort) 160-4.5 MCG/ACT inhaler, Inhale 2 puffs 2 (Two) Times a Day., Disp: 10.2 g, Rfl: 12    Collagen Hydrolysate powder, Use., Disp: , Rfl:     escitalopram (LEXAPRO) 20 MG tablet, Take 1 tablet by mouth Daily., Disp: , Rfl:     folic acid (FOLVITE) 400 MCG  tablet, Take 1 tablet by mouth Daily., Disp: , Rfl:     loratadine (CLARITIN) 10 MG tablet, Take 1 tablet by mouth Daily., Disp: , Rfl:     multivitamin with minerals (MULTIVITAMIN ADULT PO), Take 1 tablet by mouth Daily., Disp: , Rfl:     Olopatadine-Mometasone 665-25 MCG/ACT suspension, Administer 2 puffs into the nostril(s) as directed by provider 2 (Two) Times a Day., Disp: , Rfl:     Omega-3 Fatty Acids (Fish Oil) 1000 MG capsule delayed-release, Take 1,200 mg by mouth Daily., Disp: , Rfl:     Probiotic Product (PROBIOTIC PO), Take 30 mg by mouth Daily., Disp: , Rfl:     Semaglutide-Weight Management (Wegovy) 1 MG/0.5ML solution auto-injector, Inject 0.5 mL under the skin into the appropriate area as directed 1 (One) Time Per Week., Disp: 2 mL, Rfl: 1    Tirosint 137 MCG capsule, Take 1 capsule by mouth Every Night., Disp: , Rfl:     vitamin B-12 (CYANOCOBALAMIN) 100 MCG tablet, Take 10 tablets by mouth Daily., Disp: , Rfl:     vitamin D3 125 MCG (5000 UT) capsule capsule, Take 1 capsule by mouth Daily., Disp: , Rfl:      Objective     History of Present Illness   History of Present Illness  The patient is a 64-year-old female who presents with complaints of shortness of breath.    She reports experiencing dyspnea during physical exertion, such as walking or climbing stairs. Additionally, she has been producing a significant amount of phlegm, which she can expectorate, and has been experiencing constant postnasal drainage. She has a history of seasonal asthma, which is typically well-managed with her inhaler. However, due to the severity of her current symptoms, she sought immediate care on 06/02/2025. The healthcare provider there administered a breathing treatment, which effectively alleviated her symptoms. The provider also mentioned the possibility of a pulmonary embolism, which has since caused her significant anxiety. Her oxygen saturation was recorded at 94 percent during her visit to immediate care. She  was prescribed prednisone and azithromycin. She is uncertain if her symptoms are due to panic attacks or if she is physically capable of walking outside. Her symptoms have been present for over a week.     She has a history of asthma dating back to her childhood, which was particularly severe during the fall season. She was previously prescribed a rescue inhaler, which she found to be life-saving. She underwent testing for asthma two years ago and was informed that she did not have asthma but did have allergies. She was prescribed a stronger rescue inhaler at that time. She reports no leg swelling or pain.     She has been experiencing nasal congestion but reports no sneezing. She took a COVID-19 test on 06/01/2025, which was negative, but she questions its accuracy as it was from 2021.    She has been experiencing significant anxiety since the mention of a possible pulmonary embolism.    Review of Systems   Constitutional:  Negative for fever.   HENT:  Positive for congestion. Negative for sore throat.    Respiratory:  Positive for shortness of breath. Negative for wheezing.    Cardiovascular:  Negative for chest pain and leg swelling.   Gastrointestinal:  Negative for vomiting.   Neurological:  Negative for headaches.       Physical Exam  Vitals reviewed.   Constitutional:       Appearance: Normal appearance.   Cardiovascular:      Rate and Rhythm: Normal rate and regular rhythm.      Heart sounds: Normal heart sounds.   Pulmonary:      Effort: Pulmonary effort is normal.      Breath sounds: No wheezing.   Musculoskeletal:      Right lower leg: No edema.      Left lower leg: No edema.   Neurological:      Mental Status: She is alert.   Psychiatric:         Mood and Affect: Mood normal.         Behavior: Behavior normal.         Thought Content: Thought content normal.         Judgment: Judgment normal.       Physical Exam      Results         Assessment    ASSESSMENT    Problems Addressed this Visit           Pulmonary and Pneumonias    Shortness of breath - Primary     Diagnoses         Codes Comments      Shortness of breath    -  Primary ICD-10-CM: R06.02  ICD-9-CM: 786.05           Assessment & Plan  1. Asthma.  - Symptoms are indicative of asthma, with no evidence suggesting a pulmonary embolism or infection.  - Physical examination reveals stable condition with no acute distress; oxygen saturation at 96%.  - Discussion on asthma pathophysiology, including inflammation in the airways leading to bronchoconstriction and increased mucus production; advised to avoid triggers such as pollen and mold spores.  - Prescribed Airsupra for as-needed use and generic Symbicort for maintenance at 2 puffs twice daily; advised to use inhaler before physical activities to prevent bronchoconstriction; office will contact Dr. Barker office for last note review.  Contact us on [facility] in a few days to see how you are doing.    2. Anxiety.  - Reports significant anxiety related to potential pulmonary embolism and current respiratory symptoms.  - Reassurance provided that heart is fine and no evidence of pulmonary embolism.  - Discussed importance of managing anxiety and using inhaler before physical activities to prevent symptoms.  - Counseling on anxiety management and reassurance of current treatment plan.    3. Medication Management.  - Currently on prednisone and azithromycin prescribed by immediate care.  - Reports feeling jittery from steroid pack.  - Recommended to reduce prednisone dosage to 2 tablets per day to manage side effects.      PLAN  There are no Patient Instructions on file for this visit.  No follow-ups on file.    Patient or patient representative verbalized consent for the use of Ambient Listening during the visit with  Tyler Venegas MD for chart documentation. 6/3/2025  11:21 EDT

## 2025-06-03 NOTE — TELEPHONE ENCOUNTER
PLEASE DISREGARD. PT CALLED BACK AND SAID THAT PHARMACY MADE A MISTAKE AND NOTHING NEEDS TO BE DONE ON OUR END.

## 2025-06-17 ENCOUNTER — OFFICE VISIT (OUTPATIENT)
Dept: FAMILY MEDICINE CLINIC | Facility: CLINIC | Age: 65
End: 2025-06-17
Payer: COMMERCIAL

## 2025-06-17 VITALS
BODY MASS INDEX: 25.76 KG/M2 | OXYGEN SATURATION: 94 % | SYSTOLIC BLOOD PRESSURE: 110 MMHG | HEART RATE: 96 BPM | WEIGHT: 170 LBS | HEIGHT: 68 IN | DIASTOLIC BLOOD PRESSURE: 70 MMHG | RESPIRATION RATE: 16 BRPM

## 2025-06-17 DIAGNOSIS — J30.9 ALLERGIC RHINITIS, UNSPECIFIED SEASONALITY, UNSPECIFIED TRIGGER: Primary | ICD-10-CM

## 2025-06-17 DIAGNOSIS — J45.20 MILD INTERMITTENT ASTHMA WITHOUT COMPLICATION: ICD-10-CM

## 2025-06-17 PROCEDURE — 99213 OFFICE O/P EST LOW 20 MIN: CPT | Performed by: INTERNAL MEDICINE

## 2025-06-17 RX ORDER — MONTELUKAST SODIUM 10 MG/1
10 TABLET ORAL NIGHTLY
Qty: 90 TABLET | Refills: 1 | Status: SHIPPED | OUTPATIENT
Start: 2025-06-17

## 2025-06-17 RX ORDER — PREDNISONE 10 MG/1
TABLET ORAL
Qty: 1 EACH | Refills: 0 | Status: SHIPPED | OUTPATIENT
Start: 2025-06-17

## 2025-06-17 NOTE — PROGRESS NOTES
Subjective   Jessica Wood is a 64 y.o. female. Patient is here today for   Chief Complaint   Patient presents with    nasal drainage    Heartburn          Vitals:    06/17/25 1549   BP: 110/70   Pulse: 96   Resp: 16   SpO2: 94%     Body mass index is 26.06 kg/m².    The following portions of the patient's history were reviewed and updated as appropriate: allergies, current medications, past family history, past medical history, past social history, past surgical history and problem list.    Past Medical History:   Diagnosis Date    Allergic     Anxiety     Asthma due to seasonal allergies     Breast cancer 2021    LEFT BREAST. SURGERY    Cyst of joint of right hand     Hypothyroidism     COMPLETE THYROIDECTOMY BENIGN THYROID MASS    IBS (irritable bowel syndrome)     Seasonal allergies       Allergies   Allergen Reactions    Sulfa Antibiotics Hives     Hives, itching, swelling    Latex Hives     Hives and itching      Social History     Socioeconomic History    Marital status:    Tobacco Use    Smoking status: Never    Smokeless tobacco: Never   Vaping Use    Vaping status: Never Used   Substance and Sexual Activity    Alcohol use: Yes     Alcohol/week: 2.0 standard drinks of alcohol     Types: 2 Glasses of wine per week    Drug use: Never    Sexual activity: Yes     Partners: Male     Birth control/protection: Vasectomy        Current Outpatient Medications:     Albuterol-Budesonide (Airsupra) 90-80 MCG/ACT aerosol, Inhale 2 Inhalations 4 (Four) Times a Day As Needed (Shortness of breath or wheezing)., Disp: 10.7 g, Rfl: 3    Ascorbic Acid (Vitamin C) 500 MG capsule, Take 500 mg by mouth Daily., Disp: , Rfl:     budesonide-formoterol (Symbicort) 160-4.5 MCG/ACT inhaler, Inhale 2 puffs 2 (Two) Times a Day., Disp: 10.2 g, Rfl: 12    Collagen Hydrolysate powder, Use., Disp: , Rfl:     escitalopram (LEXAPRO) 20 MG tablet, Take 1 tablet by mouth Daily., Disp: , Rfl:     folic acid (FOLVITE) 400 MCG tablet, Take 1  tablet by mouth Daily., Disp: , Rfl:     loratadine (CLARITIN) 10 MG tablet, Take 1 tablet by mouth Daily., Disp: , Rfl:     montelukast (Singulair) 10 MG tablet, Take 1 tablet by mouth Every Night., Disp: 90 tablet, Rfl: 1    multivitamin with minerals (MULTIVITAMIN ADULT PO), Take 1 tablet by mouth Daily., Disp: , Rfl:     Olopatadine-Mometasone 665-25 MCG/ACT suspension, Administer 2 puffs into the nostril(s) as directed by provider 2 (Two) Times a Day., Disp: , Rfl:     Omega-3 Fatty Acids (Fish Oil) 1000 MG capsule delayed-release, Take 1,200 mg by mouth Daily., Disp: , Rfl:     predniSONE (DELTASONE) 10 MG (48) dose pack, Take as directed, Disp: 1 each, Rfl: 0    Probiotic Product (PROBIOTIC PO), Take 30 mg by mouth Daily., Disp: , Rfl:     Semaglutide-Weight Management (Wegovy) 1 MG/0.5ML solution auto-injector, Inject 0.5 mL under the skin into the appropriate area as directed 1 (One) Time Per Week., Disp: 2 mL, Rfl: 1    Tirosint 137 MCG capsule, Take 1 capsule by mouth Every Night., Disp: , Rfl:     vitamin B-12 (CYANOCOBALAMIN) 100 MCG tablet, Take 10 tablets by mouth Daily., Disp: , Rfl:     vitamin D3 125 MCG (5000 UT) capsule capsule, Take 1 capsule by mouth Daily., Disp: , Rfl:      Objective     History of Present Illness   History of Present Illness  The patient is a 64-year-old female who presents with continued complaints of postnasal drip and cough.    She reports a persistent cough and a constant trickle of phlegm, which she attributes to anxiety-induced breathlessness. She does not experience any headaches or sinus pressure. She has been using Airsupra as a rescue inhaler, which she finds beneficial, and generic Symbicort, taking 2 puffs twice daily, which has also helped. She was previously on azithromycin, which she believes was effective. She has experienced occasional headaches over the past week, managed with Advil. She has not been engaging in physical exercise due to fear of  breathlessness but acknowledges the need to resume her routine. She recalls an incident where she aspirated water, leading to wheezing, which was relieved by her emergency inhaler. Her symptoms improve when she is indoors or in cooler environments. She does not feel fatigued but admits to feeling anxious.    She has a history of dermatographism, which prevents her from receiving allergy injections. She has a dog at home but does not use a HEPA filter. 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 She is currently taking Claritin twice daily. She has been experiencing mild GERD symptoms, which she suspects are side effects of Wegovy, and occasionally burps.    Review of Systems    Physical Exam  Physical Exam  Ears: Eardrum is bulging  Nose: Turbinates bulging  Respiratory: Clear to auscultation, no wheezing, rales or rhonchi    Results         Assessment    ASSESSMENT    Problems Addressed this Visit          Allergies and Adverse Reactions    Allergic rhinitis - Primary    Relevant Medications    predniSONE (DELTASONE) 10 MG (48) dose pack       Pulmonary and Pneumonias    Intermittent asthma without complication    Relevant Medications    montelukast (Singulair) 10 MG tablet     Diagnoses         Codes Comments      Allergic rhinitis, unspecified seasonality, unspecified trigger    -  Primary ICD-10-CM: J30.9  ICD-9-CM: 477.9       Mild intermittent asthma without complication     ICD-10-CM: J45.20  ICD-9-CM: 493.90           Assessment & Plan  1. Allergic rhinitis.  - Symptoms include postnasal drip and cough, with no current sinus infection.  - The eustachian tube is congested, and the turbinates are swollen.  - A prescription for Singulair 10 mg once daily was provided. She was also advised to switch from Claritin to Zyrtec 10 mg once daily.  - The use of a HEPA filter in her bedroom was recommended to reduce exposure to allergens.  Also suggest doing nasal  irrigation.    2. Asthma.  - She experiences increased mucus production and bronchial constriction, indicative of asthma.  - She was advised to use her rescue inhaler before exercising and to keep it with her at all times.  - A prescription for a prednisone pack was provided for use if necessary during her upcoming trip.  - If symptoms do not improve, further evaluation and treatment adjustments will be considered.    3. Gastroesophageal reflux disease (GERD).  - She reported experiencing GERD symptoms, possibly related to the use of Wegovy.  - Symptoms include occasional burping and discomfort.  - Monitoring of symptoms and potential adjustments to medication will be considered.  - Further evaluation may be necessary if symptoms persist.    PLAN  There are no Patient Instructions on file for this visit.  No follow-ups on file.    Patient or patient representative verbalized consent for the use of Ambient Listening during the visit with  Tyler Venegas MD for chart documentation. 6/17/2025  16:36 EDT

## 2025-06-20 RX ORDER — SEMAGLUTIDE 1.7 MG/.75ML
1.7 INJECTION, SOLUTION SUBCUTANEOUS WEEKLY
Qty: 3 ML | Refills: 0 | Status: SHIPPED | OUTPATIENT
Start: 2025-06-20

## 2025-06-20 RX ORDER — SEMAGLUTIDE 1 MG/.5ML
1 INJECTION, SOLUTION SUBCUTANEOUS WEEKLY
Qty: 2 ML | Refills: 1 | Status: SHIPPED | OUTPATIENT
Start: 2025-06-20

## 2025-06-20 NOTE — TELEPHONE ENCOUNTER
Caller: Jessica Wood    Relationship: Self    Best call back number: [phone]     Requested Prescriptions:   Requested Prescriptions     Pending Prescriptions Disp Refills    Semaglutide-Weight Management (Wegovy) 1 MG/0.5ML solution auto-injector 2 mL 1     Sig: Inject 0.5 mL under the skin into the appropriate area as directed 1 (One) Time Per Week.        Pharmacy where request should be sent: [facility] & [address] - [phone]  - [phone] FX     Last office visit with prescribing clinician: 6/17/2025   Last telemedicine visit with prescribing clinician: Visit date not found   Next office visit with prescribing clinician: 6/2/2026     Additional details provided by patient: PATIENT CALLED EARLY DUE TO LEAVING FOR VACATION ON 6/27/25 AND NEED BEFORE LEAVING.  HAS ONE MORE LEFT FOR MONDAY 6/23/25.  ALSO PATIENT STATED THE DOSAGE WAS 1.7 MG    Does the patient have less than a 3 day supply:  [x] Yes  [] No    Would you like a call back once the refill request has been completed: [] Yes [] No    If the office needs to give you a call back, can they leave a voicemail: [] Yes [] No    Ananda Chadwick Rep   06/20/25 09:10 EDT

## 2025-06-25 ENCOUNTER — TELEPHONE (OUTPATIENT)
Dept: FAMILY MEDICINE CLINIC | Facility: CLINIC | Age: 65
End: 2025-06-25
Payer: COMMERCIAL

## 2025-06-25 NOTE — TELEPHONE ENCOUNTER
Caller: Israel Jessica S    Relationship: Self    Best call back number:[phone]      What medication are you requesting: SOMETHING FOR PULLED MUSCLE    What are your current symptoms: PULLED A MUSCLE IN HER BOTTOM SIDE    How long have you been experiencing symptoms: FEW DAYS    Have you had these symptoms before:    [x] Yes  [] No    Have you been treated for these symptoms before:   [x] Yes  [] No    If a prescription is needed, what is your preferred pharmacy and phone number: [facility] & [place], [place] - [address] [facility] - [phone] [facility] [phone] FX     Additional notes: SHE IS DOING GREAT ON HER BREATHING.  PLEASE CALL AND ADVISE WHEN SENT.

## 2025-06-27 RX ORDER — METAXALONE 800 MG/1
800 TABLET ORAL 3 TIMES DAILY PRN
Qty: 21 TABLET | Refills: 1 | Status: SHIPPED | OUTPATIENT
Start: 2025-06-27

## 2025-06-27 NOTE — TELEPHONE ENCOUNTER
Caller: Jessica Wood    Relationship: Self    Best call back number: [phone]         Who are you requesting to speak with (clinical staff, provider,  specific staff member): PCP OR CLINICAL        What was the call regarding: PATIENT IS LEAVING FOR VACATION AND IS ASKING IF THIS CAN BE CALLED IN TODAY.  SHE WOULD APPRECIATE A CALL EITHER WAY.

## 2025-06-30 NOTE — TELEPHONE ENCOUNTER
"Relay     \"Calling to see if she was able to get her medication that was sent in on 6/28/25\"                  "

## 2025-07-23 ENCOUNTER — TELEPHONE (OUTPATIENT)
Dept: FAMILY MEDICINE CLINIC | Facility: CLINIC | Age: 65
End: 2025-07-23
Payer: COMMERCIAL

## 2025-07-23 RX ORDER — SEMAGLUTIDE 2.4 MG/.75ML
2.4 INJECTION, SOLUTION SUBCUTANEOUS WEEKLY
Qty: 3 ML | Refills: 0 | Status: SHIPPED | OUTPATIENT
Start: 2025-07-23

## 2025-07-23 NOTE — TELEPHONE ENCOUNTER
Caller: Jessica Wood    Relationship: Self    Best call back number: [phone]      What medication are you requesting: Semaglutide-Weight Management (Wegovy) 2.4MG      Have you had these symptoms before:    [x] Yes  [] No    Have you been treated for these symptoms before:   [x] Yes  [] No    If a prescription is needed, what is your preferred pharmacy and phone number: [facility] & [place], [place] - [address] [facility] - [phone] [facility] [phone] FX     Additional notes:  SHE WOULD LIKE TO INCREASE THE DOSAGE

## 2025-08-18 ENCOUNTER — TELEPHONE (OUTPATIENT)
Dept: FAMILY MEDICINE CLINIC | Facility: CLINIC | Age: 65
End: 2025-08-18
Payer: COMMERCIAL

## 2025-08-20 RX ORDER — SEMAGLUTIDE 2.4 MG/.75ML
2.4 INJECTION, SOLUTION SUBCUTANEOUS WEEKLY
Qty: 3 ML | Refills: 0 | Status: SHIPPED | OUTPATIENT
Start: 2025-08-20

## (undated) DEVICE — SUT ETHLN 4/0 PS2 PLSTC 1667G

## (undated) DEVICE — LINER CANSTR SXN QUICKFIT 3000CC

## (undated) DEVICE — SUT MNCRYL PLS ANTIB UD 4/0 PS2 18IN

## (undated) DEVICE — NEEDLE, QUINCKE 22GX3.5": Brand: MEDLINE INDUSTRIES, INC.

## (undated) DEVICE — ELECTRD BLD EZ CLN MOD XLNG 2.75IN

## (undated) DEVICE — INTENDED FOR TISSUE SEPARATION, AND OTHER PROCEDURES THAT REQUIRE A SHARP SURGICAL BLADE TO PUNCTURE OR CUT.: Brand: BARD-PARKER ® CARBON RIB-BACK BLADES

## (undated) DEVICE — 3M™ IOBAN™ 2 ANTIMICROBIAL INCISE DRAPE 6650EZ: Brand: IOBAN™ 2

## (undated) DEVICE — TOTAL TRAY, 16FR 10ML SIL FOLEY, URN: Brand: MEDLINE

## (undated) DEVICE — SUT ETHLN 3/0 PS1 18IN 1663H

## (undated) DEVICE — STPCK 3WY D201 DISCOFIX

## (undated) DEVICE — TUBING, SUCTION, 1/4" X 10', STRAIGHT: Brand: MEDLINE

## (undated) DEVICE — IRRIGATOR BULB ASEPTO 60CC STRL

## (undated) DEVICE — PENCL E/S ULTRAVAC TELESCP NOSE HOLSTR 10FT

## (undated) DEVICE — BANDAGE,GAUZE,BULKEE II,4.5"X4.1YD,STRL: Brand: MEDLINE

## (undated) DEVICE — SUT SILK 2/0 FS BLK 18IN 685G

## (undated) DEVICE — DRSNG SURESITE WNDW 4X4.5

## (undated) DEVICE — SOL LR 1000ML

## (undated) DEVICE — TRAP FLD MINIVAC MEGADYNE 100ML

## (undated) DEVICE — PATIENT RETURN ELECTRODE, SINGLE-USE, CONTACT QUALITY MONITORING, ADULT, WITH 9FT CORD, FOR PATIENTS WEIGING OVER 33LBS. (15KG): Brand: MEGADYNE

## (undated) DEVICE — PREP SOL POVIDONE/IODINE BT 4OZ

## (undated) DEVICE — SYR LUERLOK 5CC

## (undated) DEVICE — BNDG ELAS ELITE V/CLOSE 6IN 5YD LF STRL

## (undated) DEVICE — NDL BLNT 18G 1 1/2IN

## (undated) DEVICE — KT ORCA ORCAPOD DISP STRL

## (undated) DEVICE — SPNG LAP 18X18IN LF STRL PK/5

## (undated) DEVICE — NDL HYPO ECLPS SFTY 22G 1 1/2IN

## (undated) DEVICE — GLV SURG BIOGEL M LTX PF 6 1/2

## (undated) DEVICE — CONN TBG Y 5 IN 1 LF STRL

## (undated) DEVICE — JACKSON-PRATT 100CC BULB RESERVOIR: Brand: CARDINAL HEALTH

## (undated) DEVICE — STPLR SKIN VISISTAT WD 35CT

## (undated) DEVICE — ADHS SKIN SURG TISS VISC PREMIERPRO EXOFIN HI/VISC FAST/DRY

## (undated) DEVICE — CANN O2 ETCO2 FITS ALL CONN CO2 SMPL A/ 7IN DISP LF

## (undated) DEVICE — ADAPT CLN BIOGUARD AIR/H2O DISP

## (undated) DEVICE — APPL CHLORAPREP HI/LITE 26ML ORNG

## (undated) DEVICE — LOU MINOR PROCEDURE: Brand: MEDLINE INDUSTRIES, INC.

## (undated) DEVICE — CVR PROB 96IN LF STRL

## (undated) DEVICE — DEV DEL BRST/IMP GEL KELLER2 FUNNEL EA/5

## (undated) DEVICE — SYR LL TP 10ML STRL

## (undated) DEVICE — GLV SURG BIOGEL LTX PF 8 1/2

## (undated) DEVICE — PK UNIV COMPL 40

## (undated) DEVICE — TBG INFILTRATION CB 156IN

## (undated) DEVICE — ANTIBACTERIAL UNDYED BRAIDED (POLYGLACTIN 910), SYNTHETIC ABSORBABLE SUTURE: Brand: COATED VICRYL

## (undated) DEVICE — SUT PDS 2/0 SH 27IN Z317H

## (undated) DEVICE — SYR LUERLOK 30CC

## (undated) DEVICE — SYRINGE,TOOMEY,IRRIGATION,70CC,STERILE: Brand: MEDLINE

## (undated) DEVICE — TBG SXN LIPO 3/8IDX5/8IN ODX10FT DISP

## (undated) DEVICE — NDL HYPO PRECISIONGLIDE REG 25G 1 1/2

## (undated) DEVICE — TOWEL,OR,DSP,ST,BLUE,STD,4/PK,20PK/CS: Brand: MEDLINE

## (undated) DEVICE — 1010 S-DRAPE TOWEL DRAPE 10/BX: Brand: STERI-DRAPE™

## (undated) DEVICE — SYS FAT GRAFTING REVOLVE SGL

## (undated) DEVICE — SUT MNCRYL 3/0 PS2 18IN MCP497G

## (undated) DEVICE — SUT VIC 3/0 TIES 18IN J110T

## (undated) DEVICE — SOL NACL 0.9PCT 100ML SGL

## (undated) DEVICE — SENSR O2 OXIMAX FNGR A/ 18IN NONSTR

## (undated) DEVICE — SYRINGE, LUER LOCK, 60ML: Brand: MEDLINE

## (undated) DEVICE — CONTAINER,SPECIMEN,O.R.STRL,4.5OZ: Brand: MEDLINE

## (undated) DEVICE — MEDI-VAC YANKAUER SUCTION HANDLE W/BULBOUS TIP: Brand: CARDINAL HEALTH

## (undated) DEVICE — SMOKE EVACUATION TUBING WITH 7/8 IN TO 1/4 IN REDUCER: Brand: BUFFALO FILTER

## (undated) DEVICE — DISPOSABLE BIPOLAR CABLE 12FT. (3.6M): Brand: KIRWAN

## (undated) DEVICE — Device

## (undated) DEVICE — LN SMPL CO2 SHTRM SD STREAM W/M LUER

## (undated) DEVICE — INTENDED FOR TISSUE SEPARATION, AND OTHER PROCEDURES THAT REQUIRE A SHARP SURGICAL BLADE TO PUNCTURE OR CUT.: Brand: BARD-PARKER ® STAINLESS STEEL BLADES

## (undated) DEVICE — PENCL ES MEGADINE EZ/CLEAN BUTN W/HOLSTR 10FT

## (undated) DEVICE — CONTAINER,SPECIMEN,OR STERILE,4OZ: Brand: MEDLINE

## (undated) DEVICE — COVER,MAYO STAND,STERILE: Brand: MEDLINE

## (undated) DEVICE — BIOPATCH™ ANTIMICROBIAL DRESSING WITH CHLORHEXIDINE GLUCONATE IS A HYDROPHILLIC POLYURETHANE ABSORPTIVE FOAM WITH CHLORHEXIDINE GLUCONATE (CHG) WHICH INHIBITS BACTERIAL GROWTH UNDER THE DRESSING. THE DRESSING IS INTENDED TO BE USED TO ABSORB EXUDATE, COVER A WOUND CAUSED BY VASCULAR AND NONVASCULAR PERCUTANEOUS MEDICAL DEVICES DURING SURGERY, AS WELL AS REDUCE LOCAL INFECTION AND COLONIZATION OF MICROORGANISMS.: Brand: BIOPATCH

## (undated) DEVICE — BLAKE SILICONE DRAIN, 15 FR ROUND, HUBLESS: Brand: BLAKE

## (undated) DEVICE — PROXIMATE RH ROTATING HEAD SKIN STAPLERS (35 WIDE) CONTAINS 35 STAINLESS STEEL STAPLES: Brand: PROXIMATE